# Patient Record
Sex: MALE | Race: WHITE | NOT HISPANIC OR LATINO | Employment: OTHER | URBAN - METROPOLITAN AREA
[De-identification: names, ages, dates, MRNs, and addresses within clinical notes are randomized per-mention and may not be internally consistent; named-entity substitution may affect disease eponyms.]

---

## 2017-12-12 ENCOUNTER — HOSPITAL ENCOUNTER (OUTPATIENT)
Dept: PREADMISSION TESTING | Facility: HOSPITAL | Age: 62
Discharge: HOME | End: 2017-12-12
Attending: PODIATRIST
Payer: COMMERCIAL

## 2017-12-12 ENCOUNTER — ANESTHESIA EVENT (OUTPATIENT)
Dept: SURGERY | Facility: HOSPITAL | Age: 62
End: 2017-12-12
Payer: COMMERCIAL

## 2017-12-12 VITALS
SYSTOLIC BLOOD PRESSURE: 138 MMHG | HEIGHT: 67 IN | HEART RATE: 84 BPM | BODY MASS INDEX: 27.86 KG/M2 | WEIGHT: 177.5 LBS | RESPIRATION RATE: 18 BRPM | TEMPERATURE: 98 F | OXYGEN SATURATION: 95 % | DIASTOLIC BLOOD PRESSURE: 96 MMHG

## 2017-12-12 DIAGNOSIS — Q85.00: ICD-10-CM

## 2017-12-12 DIAGNOSIS — B20 HIV (HUMAN IMMUNODEFICIENCY VIRUS INFECTION): ICD-10-CM

## 2017-12-12 DIAGNOSIS — Z01.818 PRE-OP EXAM: Primary | ICD-10-CM

## 2017-12-12 DIAGNOSIS — N40.0 BENIGN PROSTATIC HYPERPLASIA WITHOUT LOWER URINARY TRACT SYMPTOMS: ICD-10-CM

## 2017-12-12 DIAGNOSIS — I47.10 PAROXYSMAL SVT (SUPRAVENTRICULAR TACHYCARDIA): ICD-10-CM

## 2017-12-12 DIAGNOSIS — G47.33 OSA (OBSTRUCTIVE SLEEP APNEA): ICD-10-CM

## 2017-12-12 DIAGNOSIS — I10 ESSENTIAL HYPERTENSION: ICD-10-CM

## 2017-12-12 DIAGNOSIS — E78.00 HYPERCHOLESTEROLEMIA: ICD-10-CM

## 2017-12-12 RX ORDER — LIDOCAINE HYDROCHLORIDE 10 MG/ML
1 INJECTION, SOLUTION EPIDURAL; INFILTRATION; INTRACAUDAL; PERINEURAL ONCE
Status: CANCELLED | OUTPATIENT
Start: 2017-12-12 | End: 2017-12-12

## 2017-12-12 RX ORDER — EFAVIRENZ, EMTRICITABINE AND TENOFOVIR DISOPROXIL FUMARATE 600; 200; 300 MG/1; MG/1; MG/1
1 TABLET, FILM COATED ORAL NIGHTLY
COMMUNITY
End: 2019-01-29 | Stop reason: SDUPTHER

## 2017-12-12 RX ORDER — PRAVASTATIN SODIUM 40 MG/1
40 TABLET ORAL DAILY
COMMUNITY
End: 2021-04-27 | Stop reason: SDUPTHER

## 2017-12-12 RX ORDER — SODIUM CHLORIDE, SODIUM LACTATE, POTASSIUM CHLORIDE, CALCIUM CHLORIDE 600; 310; 30; 20 MG/100ML; MG/100ML; MG/100ML; MG/100ML
INJECTION, SOLUTION INTRAVENOUS CONTINUOUS
Status: CANCELLED | OUTPATIENT
Start: 2017-12-12

## 2017-12-12 RX ORDER — MIRTAZAPINE 30 MG/1
15 TABLET, ORALLY DISINTEGRATING ORAL NIGHTLY
COMMUNITY

## 2017-12-12 RX ORDER — HYDROGEN PEROXIDE 3 %
20 SOLUTION, NON-ORAL MISCELLANEOUS
COMMUNITY
End: 2020-05-26 | Stop reason: SDUPTHER

## 2017-12-12 RX ORDER — OMEPRAZOLE 10 MG/1
10 CAPSULE, DELAYED RELEASE ORAL DAILY
COMMUNITY
End: 2018-11-30

## 2017-12-12 NOTE — ANESTHESIA PREPROCEDURE EVALUATION
12/12/2017  Dmitry Epps is a 62 y.o., male with CINDY not on CPAP is scheduled for excision of neuroma w/biopsy and implant insertion under MAC/gen on 12/14/2017.    Requested most recent H&P/notes from outside cardiology and PCP:  PCP annual exam from 9/08/2017 and is in pt chart.  ID labs from 10/2017 reviewed and in pt chart.  Outside Cardiology note from 11/27/2017 with EKG (reviewed and NSR) in pt chart.      Past Surgical History:   Procedure Laterality Date    UVULECTOMY  2011            Anesthesia Evaluation    I have reviewed the Patient Summary Reports.    I have reviewed the Nursing Notes.   I have reviewed the Medications.     Review of Systems  Anesthesia Hx:  No problems with previous Anesthesia  History of prior surgery of interest to airway management or planning: Previous anesthesia: General, MAC  Denies Personal Hx of Anesthesia complications.   Social:  Non-Smoker, Alcohol Use    Hematology/Oncology:  Hematology Normal       -- Immunodeficiency Disorder (on atripla antiretroviral treatment; states CD4 counts and undectable viral load from outside MD):   EENT/Dental:EENT/Dental Normal   Cardiovascular:   Exercise tolerance: good Denies Hypertension. Dysrhythmias (hx of SVT; no recent episodes)   Denies Angina. hyperlipidemia        Pulmonary:   Denies Shortness of breath. 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   Neurological:  Neurology Normal  Neurofibromatosis Movement Disorder Dx, Restless Leg Syndrome   Endocrine:  Endocrine Normal    Psych:   depression          Physical Exam  General:  Well nourished    Airway/Jaw/Neck:  Airway Findings: Mouth Opening: Normal Tongue: Normal  General Airway Assessment: Adult  Oropharynx Findings: (s/p uvulectomy)  Hoarseness  Mallampati: II  TM Distance: Normal, at least 6 cm        Eyes/Ears/Nose:  EYES/EARS/NOSE FINDINGS: Normal   Dental:  Dental Findings: Periodontal disease, Mild   Chest/Lungs:  Chest/Lungs Clear    Heart/Vascular:  Heart Findings: Normal Heart murmur: negative    Abdomen:  Abdomen Findings: Normal    Musculoskeletal:  Musculoskeletal Findings: Tender Joint Neuroma left foot between 2nd and 3rd toes     Mental Status:  Mental Status Findings: Normal        Anesthesia Plan  Type of Anesthesia, risks & benefits discussed:  Anesthesia Type:  general, MAC  Patient's Preference:   Intra-op Monitoring Plan:   Intra-op Monitoring Plan Comments:   Post Op Pain Control Plan:   Post Op Pain Control Plan Comments:   Induction:   IV  Beta Blocker:  Patient is not currently on a Beta-Blocker (No further documentation required).       Informed Consent: Patient understands risks and agrees with Anesthesia plan.  Questions answered.   ASA Score: 2     Day of Surgery Review of History & Physical:     H&P completed by Anesthesiologist.   Anesthesia Plan Notes: Anesthesia consent will be obtained prior to procedure on 12/14/2017.    Requested most recent H&P/notes from outside cardiology and PCP:  PCP annual exam from 9/08/2017 and is in pt chart.  ID labs from 10/2017 reviewed and in pt chart.  Outside Cardiology note from 11/27/2017 with EKG (reviewed and NSR) in pt chart.             Ready For Surgery From Anesthesia Perspective.

## 2017-12-12 NOTE — DISCHARGE INSTRUCTIONS
Your surgery is scheduled for 12/14.    Please report to Outpatient Surgery Intake Office on the 2nd FLOOR at 0530a.m.          INSTRUCTIONS IMPORTANT!!!  ¨ Do not eat or drink after 12 midnight-including water. OK to brush teeth, no   gum, candy or mints!    ¨ Take only these medicines with a small swallow of water-morning of surgery.        ____  Proceed to [facility] on *** for additional blood test.        ____  Do not wear makeup, including mascara.  ____  No powder, lotions or creams to surgical area.  ____  Please remove all jewelry, including piercings and leave at home.  ____  No money or valuables needed. Please leave at home.  ____  Please bring any documents given by your doctor.  ____  If going home the same day, arrange for a ride home. You will not be able to             drive if Anesthesia was used.  ____  Wear loose fitting clothing. Allow for dressings, bandages.  ____  Stop Aspirin, Ibuprofen, Motrin and Aleve at least 3-5 days before surgery, unless otherwise instructed by your doctor, or the nurse.   You MAY use Tylenol/acetaminophen until day of surgery.  ____  Wash the surgical area with Hibiclens the night before surgery, and again the             morning of surgery.  Be sure to rinse hibiclens off completely (if instructed by   nurse).  ____  If you take diabetic medication, do not take am of surgery unless instructed by Doctor.  ____  Call MD for temperature above 101 degrees.  ____ Stop taking any Fish Oil supplement or any Vitamins that contain Vitamin E at least 5 days prior to surgery.  ____ Do Not wear your contact lenses the day of your procedure.  You may wear your glasses.        I have read or had read and explained to me, and understand the above information.  Additional comments or instructions:  For additional questions call [phone]          Pre-Op Bathing Instructions    Before surgery, you can play an important role in your own health.    Because skin is not  sterile, we need to be sure that your skin is as free of germs as possible. By following the instructions below, you can reduce the number of germs on your skin before surgery.    IMPORTANT: You will need to shower with a special soap called Hibiclens*, available at any pharmacy.  If you are allergic to Chlorhexidine (the antiseptic in Hibiclens), use an antibacterial soap such as Dial Soap for your preoperative shower.  You will shower with Hibiclens both the night before your surgery and the morning of your surgery.  Do not use Hibiclens on the head, face or genitals to avoid injury to those areas.    STEP #1: THE NIGHT BEFORE YOUR SURGERY     1. Do not shave the area of your body where your surgery will be performed.  2. Shower and wash your hair and body as usual with your normal soap and shampoo.  3. Rinse your hair and body thoroughly after you shower to remove all soap residue.  4. With your hand, apply one packet of Hibiclens soap to the surgical site.   5. Wash the site gently for five (5) minutes. Do not scrub your skin too hard.   6. Do not wash with your regular soap after Hibiclens is used.  7. Rinse your body thoroughly.  8. Pat yourself dry with a clean, soft towel.  9. Do not use lotion, cream, or powder.  10. Wear clean clothes.    STEP #2: THE MORNING OF YOUR SURGERY     1. Repeat Step #1.    * Not to be used by people allergic to Chlorhexidine.        Foot Surgery: Neuroma or Plantar Callus  Tight shoes and high heels can place extra pressure on the ball of your foot, causing neuromas and calluses. A neuroma is an inflamed nerve. It can cause pain, numbness, or burning. A plantar callus is a buildup of hard skin on the ball of the foot. The callus may feel like a stone in your shoe.  There are many nonsurgical treatments for neuromas and calluses, but if these are not helpful, surgery may be considered.    Neuroma  When two metatarsal bones are squeezed together, they may pinch the nerve that runs  between them. The pinched nerve can become swollen and painful. This often happens at the base of the third and the fourth toes. Standing or walking for a while can increase the pain.  Neuroma removal  The enlarged portion of the inflamed nerve is removed. Most often, you can bear weight on your foot right away. You may have to wear a surgical shoe for a few weeks. When healed, a small area may feel numb, where part of the nerve was taken out.    Plantar callus  When one metatarsal bone is longer or lower than the others, it presses on the skin beneath, forming a callus. Wearing shoes with thin soles and high heels can also place extra pressure on the ball of your foot. As a result, the callus may cause foot pain and irritation.  Bone removal  The affected metatarsal bone is cut and aligned with the other metatarsals (oblique osteotomy). Screws or pins may be used to hold the bone in position. Only part of the metatarsal bone is removed. The plantar callus should go away on its own over time.  Date Last Reviewed: 10/15/2015  © 4467-1084 [employer]. [address] [place] [address]. All rights reserved. This information is not intended as a substitute for professional medical care. Always follow your healthcare professional's instructions.      Anesthesia: Monitored Anesthesia Care (MAC)    Youre due to have surgery. During surgery, youll be given medicine called anesthesia. This will keep you comfortable and pain-free. Your surgeon will use monitored anesthesia care (MAC). This sheet tells you more about this type of anesthesia.  What is monitored anesthesia care?  MAC keeps you very drowsy during surgery. You may be awake, but you will likely not remember much. And you wont feel pain. With MAC, medicines are given through an IV line into a vein in your arm or hand. A local anesthetic will usually be injected into the skin and muscle around the surgical site to numb it. The anesthesia  provider monitors you during the procedure. He or she checks your heart rate and rhythm, blood pressure, and blood oxygen level.  Anesthesia tools and medicines that may be near you during your procedure  You will likely have:  · A pulse oximeter on the end of your finger. This measures your blood oxygen level.  · Electrocardiography leads (electrodes) on your chest. These record your heart rate and rhythm.  · Medicines given through an IV. These relax you and prevent pain. You may be awake or sleep lightly. If you have local anesthetic, it is injected directly into your skin.  · A facemask to give you oxygen, if needed.  Risks and possible complications  MAC has some risks. These include:  · Breathing problems  · Nausea and vomiting  · Allergic reaction to the anesthetic    Anesthesia safety  Tips for anesthesia safety include the following:   · Follow all instructions you are given for how long not to eat or drink before your procedure.  · Be sure your healthcare provider knows what medicines you take, especially any anti-inflammatory medicine or blood thinners. This includes aspirin and any other over-the-counter medicines, herbs, and supplements.  · Have an adult family member or friend drive you home after the procedure.  · For the first 24 hours after your surgery:  ¨ Do not drive or use heavy equipment.  ¨ Do not make important decisions or sign documents.  ¨ Avoid alcohol.  ¨ Have someone stay with you, if possible. They can watch for problems and help keep you safe.  Date Last Reviewed: 12/1/2016  © 0909-3337 [employer]. [address] [place] [address], [place] [address]. All rights reserved. This information is not intended as a substitute for professional medical care. Always follow your healthcare professional's instructions.

## 2017-12-12 NOTE — PRE-PROCEDURE INSTRUCTIONS
Pt arranging for ride home.      Allergies, medical, surgical, family and psychosocial histories reviewed with patient. Periop plan of care reviewed. Preop instructions given, including medications to take and to hold. Time allotted for questions to be addressed.  Patient verbalized understanding.

## 2017-12-13 PROBLEM — Q85.00: Status: ACTIVE | Noted: 2017-12-13

## 2017-12-13 PROBLEM — E78.00 HYPERCHOLESTEROLEMIA: Status: ACTIVE | Noted: 2017-12-13

## 2017-12-13 PROBLEM — I47.10 PAROXYSMAL SVT (SUPRAVENTRICULAR TACHYCARDIA): Status: ACTIVE | Noted: 2017-12-13

## 2017-12-13 PROBLEM — I10 ESSENTIAL HYPERTENSION: Status: ACTIVE | Noted: 2017-12-13

## 2017-12-13 PROBLEM — N40.0 BENIGN PROSTATIC HYPERPLASIA WITHOUT LOWER URINARY TRACT SYMPTOMS: Status: ACTIVE | Noted: 2017-12-13

## 2017-12-14 ENCOUNTER — ANESTHESIA (OUTPATIENT)
Dept: SURGERY | Facility: HOSPITAL | Age: 62
End: 2017-12-14
Payer: COMMERCIAL

## 2017-12-14 ENCOUNTER — HOSPITAL ENCOUNTER (OUTPATIENT)
Facility: HOSPITAL | Age: 62
Discharge: HOME | End: 2017-12-14
Attending: PODIATRIST | Admitting: PODIATRIST
Payer: COMMERCIAL

## 2017-12-14 DIAGNOSIS — D36.10 NEUROMA: ICD-10-CM

## 2017-12-14 PROCEDURE — 63600175 PHARM REV CODE 636 W HCPCS: Performed by: NURSE ANESTHETIST, CERTIFIED REGISTERED

## 2017-12-14 PROCEDURE — 27800903 OPTIME MED/SURG SUP & DEVICES OTHER IMPLANTS: Performed by: PODIATRIST

## 2017-12-14 PROCEDURE — V2790 AMNIOTIC MEMBRANE: HCPCS | Performed by: PODIATRIST

## 2017-12-14 PROCEDURE — 88305 TISSUE EXAM BY PATHOLOGIST: CPT | Mod: 26,,, | Performed by: PATHOLOGY

## 2017-12-14 PROCEDURE — 71000015 HC POSTOP RECOV 1ST HR: Performed by: PODIATRIST

## 2017-12-14 PROCEDURE — 25000003 PHARM REV CODE 250: Performed by: PODIATRIST

## 2017-12-14 PROCEDURE — 63600175 PHARM REV CODE 636 W HCPCS: Performed by: PODIATRIST

## 2017-12-14 PROCEDURE — 37000008 HC ANESTHESIA 1ST 15 MINUTES: Performed by: PODIATRIST

## 2017-12-14 PROCEDURE — 36000706: Performed by: PODIATRIST

## 2017-12-14 PROCEDURE — 37000009 HC ANESTHESIA EA ADD 15 MINS: Performed by: PODIATRIST

## 2017-12-14 PROCEDURE — 88305 TISSUE EXAM BY PATHOLOGIST: CPT | Performed by: PATHOLOGY

## 2017-12-14 PROCEDURE — 36000707: Performed by: PODIATRIST

## 2017-12-14 PROCEDURE — 25000003 PHARM REV CODE 250: Performed by: NURSE PRACTITIONER

## 2017-12-14 DEVICE — IMPLANTABLE DEVICE: Type: IMPLANTABLE DEVICE | Site: FOOT | Status: FUNCTIONAL

## 2017-12-14 DEVICE — TISSUE MATRIX BIOD RESTORE XLG: Type: IMPLANTABLE DEVICE | Site: FOOT | Status: FUNCTIONAL

## 2017-12-14 RX ORDER — ROPIVACAINE HYDROCHLORIDE 5 MG/ML
INJECTION, SOLUTION EPIDURAL; INFILTRATION; PERINEURAL
Status: DISCONTINUED | OUTPATIENT
Start: 2017-12-14 | End: 2017-12-14 | Stop reason: HOSPADM

## 2017-12-14 RX ORDER — MIDAZOLAM HYDROCHLORIDE 1 MG/ML
INJECTION, SOLUTION INTRAMUSCULAR; INTRAVENOUS
Status: DISCONTINUED | OUTPATIENT
Start: 2017-12-14 | End: 2017-12-14

## 2017-12-14 RX ORDER — SODIUM CHLORIDE, SODIUM LACTATE, POTASSIUM CHLORIDE, CALCIUM CHLORIDE 600; 310; 30; 20 MG/100ML; MG/100ML; MG/100ML; MG/100ML
INJECTION, SOLUTION INTRAVENOUS CONTINUOUS
Status: DISCONTINUED | OUTPATIENT
Start: 2017-12-14 | End: 2017-12-14 | Stop reason: HOSPADM

## 2017-12-14 RX ORDER — LIDOCAINE HYDROCHLORIDE 10 MG/ML
INJECTION, SOLUTION EPIDURAL; INFILTRATION; INTRACAUDAL; PERINEURAL
Status: DISCONTINUED | OUTPATIENT
Start: 2017-12-14 | End: 2017-12-14 | Stop reason: HOSPADM

## 2017-12-14 RX ORDER — PROPOFOL 10 MG/ML
VIAL (ML) INTRAVENOUS CONTINUOUS PRN
Status: DISCONTINUED | OUTPATIENT
Start: 2017-12-14 | End: 2017-12-14

## 2017-12-14 RX ORDER — CEFAZOLIN SODIUM 2 G/50ML
2 SOLUTION INTRAVENOUS ONCE
Status: COMPLETED | OUTPATIENT
Start: 2017-12-14 | End: 2017-12-14

## 2017-12-14 RX ORDER — HYDROCODONE BITARTRATE AND ACETAMINOPHEN 5; 325 MG/1; MG/1
1 TABLET ORAL EVERY 4 HOURS PRN
Status: DISCONTINUED | OUTPATIENT
Start: 2017-12-14 | End: 2017-12-14 | Stop reason: HOSPADM

## 2017-12-14 RX ORDER — LIDOCAINE HYDROCHLORIDE 10 MG/ML
1 INJECTION, SOLUTION EPIDURAL; INFILTRATION; INTRACAUDAL; PERINEURAL ONCE
Status: DISCONTINUED | OUTPATIENT
Start: 2017-12-14 | End: 2017-12-14 | Stop reason: HOSPADM

## 2017-12-14 RX ORDER — FENTANYL CITRATE 50 UG/ML
INJECTION, SOLUTION INTRAMUSCULAR; INTRAVENOUS
Status: DISCONTINUED | OUTPATIENT
Start: 2017-12-14 | End: 2017-12-14

## 2017-12-14 RX ORDER — LIDOCAINE HCL/PF 100 MG/5ML
SYRINGE (ML) INTRAVENOUS
Status: DISCONTINUED | OUTPATIENT
Start: 2017-12-14 | End: 2017-12-14

## 2017-12-14 RX ORDER — HYDROCODONE BITARTRATE AND ACETAMINOPHEN 5; 325 MG/1; MG/1
1 TABLET ORAL EVERY 6 HOURS PRN
Qty: 30 TABLET | Refills: 0 | Status: SHIPPED | OUTPATIENT
Start: 2017-12-14 | End: 2019-07-12

## 2017-12-14 RX ORDER — PROPOFOL 10 MG/ML
VIAL (ML) INTRAVENOUS
Status: DISCONTINUED | OUTPATIENT
Start: 2017-12-14 | End: 2017-12-14

## 2017-12-14 RX ADMIN — SODIUM CHLORIDE, SODIUM LACTATE, POTASSIUM CHLORIDE, AND CALCIUM CHLORIDE: .6; .31; .03; .02 INJECTION, SOLUTION INTRAVENOUS at 06:12

## 2017-12-14 RX ADMIN — LIDOCAINE HYDROCHLORIDE 80 MG: 20 INJECTION, SOLUTION INTRAVENOUS at 07:12

## 2017-12-14 RX ADMIN — PROPOFOL 30 MG: 10 INJECTION, EMULSION INTRAVENOUS at 07:12

## 2017-12-14 RX ADMIN — CEFAZOLIN SODIUM 2 G: 2 SOLUTION INTRAVENOUS at 06:12

## 2017-12-14 RX ADMIN — MIDAZOLAM 2 MG: 1 INJECTION INTRAMUSCULAR; INTRAVENOUS at 06:12

## 2017-12-14 RX ADMIN — FENTANYL CITRATE 50 MCG: 50 INJECTION, SOLUTION INTRAMUSCULAR; INTRAVENOUS at 06:12

## 2017-12-14 RX ADMIN — FENTANYL CITRATE 50 MCG: 50 INJECTION, SOLUTION INTRAMUSCULAR; INTRAVENOUS at 07:12

## 2017-12-14 RX ADMIN — PROPOFOL 150 MCG/KG/MIN: 10 INJECTION, EMULSION INTRAVENOUS at 07:12

## 2017-12-14 NOTE — ANESTHESIA POSTPROCEDURE EVALUATION
"Anesthesia Post Evaluation    Patient: Dmitry Epps    Procedure(s) Performed: Procedure(s) (LRB):  EXCISION-NEUROMA (Left)  INSERTION-IMPLANT (Left)  BIOPSY (Left)    Final Anesthesia Type: MAC  Patient location during evaluation: OPS  Patient participation: Yes- Able to Participate  Level of consciousness: awake and alert and oriented  Post-procedure vital signs: reviewed and stable  Pain management: adequate  Airway patency: patent  PONV status at discharge: No PONV  Anesthetic complications: no      Cardiovascular status: blood pressure returned to baseline and hemodynamically stable  Respiratory status: unassisted  Hydration status: euvolemic  Follow-up not needed.        Visit Vitals  /76 (BP Location: Left arm, Patient Position: Lying)   Pulse 69   Temp 37 °C (98.6 °F) (Oral)   Resp 18   Ht 5' 7" (1.702 m)   Wt 80.3 kg (177 lb)   SpO2 98%   BMI 27.72 kg/m²       Pain/Cuate Score: Pain Assessment Performed: Yes (12/14/2017  6:24 AM)  Presence of Pain: denies (12/14/2017  6:24 AM)      "

## 2017-12-14 NOTE — OP NOTE
Operative Note       Surgery Date: 12/14/2017     Surgeon(s) and Role:     * Josef Newton DPM - Primary     * Gilda Schneider DPM - 1st Assist     Pre-op Diagnosis:  Pain in left toe(s) [M79.675]  Gonzalez's neuroma, left [G57.62]    Post-op Diagnosis: Post-Op Diagnosis Codes:     * Pain in left toe(s) [M79.675]     * Gonzalez's neuroma, left [G57.62]    Procedure(s) (LRB):  EXCISION-NEUROMA (Left)  INSERTION-IMPLANT (Left)  BIOPSY (Left)    Anesthesia: Monitor Anesthesia Care    Procedure in Detail/Findings:  The patient was brought to the operating room on a stretcher and placed on the operating table in a supine position. Following the successful induction of MAC anesthesia, a tourniquet was applied to the patients left ankle. Following this, a local anesthetic block consisting of aproximately 15 cc ml of 1:1 mixture of 2% lidocaine plain + 0.5% bupivacaine plain was injected as described above. Then, the left foot was scrubbed, prepped and draped in the usual aseptic manner. A marking pen was utilized to create a incision guide in a curve linear fashion within the 2nd intermetatarsal space which extended approximately 3-4cm. A time out was performed and an esmarch was used to exsanguinate the foot. The tourniquet was inflated to 250mmHg.      Attention was directed to the marked incision where a #15 blade was used to make the skin incision which was deepened down to the subcutaneous tissue. Care was taken to avoid all neurovascular structures. The incision was bluntly deepened down to the level of the deep transverse intermetatarsal ligament, which was isolated using a curved hemostat and resected with a #15 blade. Once this was done, toes 2nd and 3rd were plantarflexed and an enlarged bulbous nerve structure with multiple branches was obviously visible. This was grasped with a hemostat and a was bluntly  from its soft tissue structures. The nerve was noted to be excessively enlarged and bulbous in  nature. The nerve was followed distally and proximally as far as possible and was resected using a #15 blade. The area was inspected for any remaining nerve structures, which were resected as necessary. All resected soft tissue nerve structures were placed in a sterile specimen cup and sent for pathologic testing. The area was copiously irrigated with saline and deep 3-0 Vicryl suture was used to reapproximate the deeper tissues and eliminate any dead space. This same exact procedure was repeated to the 3rd interspace of the same foot. Subcutaneous tissue was reapproximated using 3-0 Vicryl. The skin was reapproximated using 4-0 nylon using interrupted horizontal mattress sutures. The skin was cleansed using saline soaked gauze and dried. Xeroform was applied to the incision and covered with sterile 4x4 gauze, Kerlix and ACE wrap. The foot was secured in a post op shoe.     The patient tolerated the procedure and anesthesia well. He was transferred to the recovery room with vital signs stable, vascular status intact, and capillary refill time < 3 seconds to the distal left foot.     Estimated Blood Loss: < 5 cc            Specimens     Start     Ordered    12/14/17 0745  Specimen to Pathology - Surgery  Once      12/14/17 0744        Implants:   Implant Name Type Inv. Item Serial No.  Lot No. LRB No. Used   [id number]  TISSUE [employer] BIOD RESTORE XLG [id number] Cumed  Left 1   [id number]     [id number] [employer]   Left 1              Disposition: PACU - hemodynamically stable.           Condition: Good    Attestation:  I was present and scrubbed for the entire procedure.           Discharge Note    Admit Date: 12/14/2017    Attending Physician: Josef Newton DPM     Discharge Physician: Josef Newton DPM    Final Diagnosis: Post-Op Diagnosis Codes:     * Pain in left toe(s) [M79.675]     * Gonzalez's neuroma, left [G57.62]    Disposition: Home or Self Care    Patient Instructions:    Current Discharge Medication List      START taking these medications    Details   hydrocodone-acetaminophen 5-325mg (NORCO) 5-325 mg per tablet Take 1 tablet by mouth every 6 (six) hours as needed for Pain.  Qty: 30 tablet, Refills: 0         CONTINUE these medications which have NOT CHANGED    Details   efavirenz-emtrictabine-tenofovir 600-200-300 mg (ATRIPLA) 600-200-300 mg Tab Take 1 tablet by mouth every evening.      esomeprazole (NEXIUM) 20 MG capsule Take 20 mg by mouth before breakfast.      mirtazapine (REMERON) 45 MG tablet Take 45 mg by mouth nightly.      omeprazole (PRILOSEC) 10 MG capsule Take 10 mg by mouth once daily.      pravastatin (PRAVACHOL) 40 MG tablet Take 40 mg by mouth once daily.             Discharge Procedure Orders (must include Diet, Follow-up, Activity)    Discharge Procedure Orders (must include Diet, Follow-up, Activity)  Diet general     Weight bearing restrictions (specify)   Order Comments: NWB to sx forefoot     Keep surgical extremity elevated     Leave dressing on - Keep it clean, dry, and intact until clinic visit          Discharge Date: No discharge date for patient encounter.

## 2017-12-14 NOTE — DISCHARGE INSTRUCTIONS
Discharge Instructions for Foot Surgery  Arrange to have an adult drive you home after surgery. If you had general anesthesia, it may take a day or more to fully recover. So, for at least the next 24 hours: Do not drive or use machinery or power tools; do not drink alcohol; and do not make any major decisions.    Diet  Here are some dietary suggestions following surgery:   · Start with liquids and light foods (like dry toast, bananas, and applesauce). As you feel up to it, slowly return to your normal diet.  · Drink at least 6 to 8 glasses of water or other nonalcoholic fluids a day.  · To avoid nausea, eat before taking narcotic pain medicines.  ·   Medicines  It is important to follow these directions:   · Take all medicines as instructed.  · Take pain medicines on time. Do not wait until the pain is bad before taking your medicines.  · Avoid alcohol while on pain medicines.  ·   Activity  These instructions are to help with your recovery:   · Sit or lie down when possible. Put a pillow under your heel to raise your foot above the level of your heart.  · Wrap an ice pack or bag of frozen peas in a thin cloth. Place it over your bandaged foot for no longer than 20 minutes. Do this three times a day.  · You can drive again in seven days or as instructed by your healthcare provider.  · Wear your surgical shoe at all times unless told otherwise by your healthcare provider.  · Follow your healthcare providers instructions about putting weight on your foot. OK to apply weight to heel for transfers and short distances.  ·   Bandage and cast care  Here are tips to follow:   · Do not shower for 48 hours.  · When you can shower again, cover the bandage or cast with a plastic bag to keep it dry.  · Dont remove your bandage until your healthcare provider tells you to. If your bandage gets wet or dirty, check with your healthcare provider. You can likely replace it with a clean, dry one.  ·   What to expect  It is normal  to have the following:  · Bruising and slight swelling of the foot and toes  · A small amount of blood on the dressing  Call your healthcare provider   Contact your healthcare provider right away if you have any of the following:   · Continuous bleeding through the bandage  · Excessive swelling, increased bleeding, or redness  · Fever over 100.4°F (38°C) or chills  · Pain unrelieved by pain medicines  · Foot feels cold to the touch or numb  · Increased ache in your leg or foot  Chest pain or shortness of breath      Discharge Instructions: After Your Surgery  Youve just had surgery. During surgery, you were given medicine called anesthesia to keep you relaxed and free of pain. After surgery, you may have some pain or nausea. This is common. Here are some tips for feeling better and getting well after surgery.     Stay on schedule with your medicine.       Going home  Your healthcare provider will show you how to take care of yourself when you go home. He or she will also answer your questions. Have an adult family member or friend drive you home. For the first 24 hours after your surgery:  · Do not drive or use heavy equipment.  · Do not make important decisions or sign legal papers.  · Do not drink alcohol.  · Have someone stay with you, if needed. He or she can watch for problems and help keep you safe.  Be sure to go to all follow-up visits with your healthcare provider. And rest after your surgery for as long as your healthcare provider tells you to.      Coping with pain  If you have pain after surgery, pain medicine will help you feel better. Take it as told, before pain becomes severe. Also, ask your healthcare provider or pharmacist about other ways to control pain. This might be with heat, ice, or relaxation. And follow any other instructions your surgeon or nurse gives you.      Tips for taking pain medicine  To get the best relief possible, remember these points:  · Pain medicines can upset your stomach.  Taking them with a little food may help.  · Most pain relievers taken by mouth need at least 20 to 30 minutes to start to work.  · Taking medicine on a schedule can help you remember to take it. Try to time your medicine so that you can take it before starting an activity. This might be before you get dressed, go for a walk, or sit down for dinner.  · Constipation is a common side effect of pain medicines. Call your healthcare provider before taking any medicines such as laxatives or stool softeners to help ease constipation. Also ask if you should skip any foods. Drinking lots of fluids and eating foods such as fruits and vegetables that are high in fiber can also help. Remember, do not take laxatives unless your surgeon has prescribed them.  · Drinking alcohol and taking pain medicine can cause dizziness and slow your breathing. It can even be deadly. Do not drink alcohol while taking pain medicine.  · Pain medicine can make you react more slowly to things. Do not drive or run machinery while taking pain medicine.  Your healthcare provider may tell you to take acetaminophen to help ease your pain. Ask him or her how much you are supposed to take each day. Acetaminophen or other pain relievers may interact with your prescription medicines or other over-the-counter (OTC) medicines. Some prescription medicines have acetaminophen and other ingredients. Using both prescription and OTC acetaminophen for pain can cause you to overdose. Read the labels on your OTC medicines with care. This will help you to clearly know the list of ingredients, how much to take, and any warnings. It may also help you not take too much acetaminophen. If you have questions or do not understand the information, ask your pharmacist or healthcare provider to explain it to you before you take the OTC medicine.      Managing nausea  Some people have an upset stomach after surgery. This is often because of anesthesia, pain, or pain medicine, or  the stress of surgery. These tips will help you handle nausea and eat healthy foods as you get better. If you were on a special food plan before surgery, ask your healthcare provider if you should follow it while you get better. These tips may help:  · Do not push yourself to eat. Your body will tell you when to eat and how much.  · Start off with clear liquids and soup. They are easier to digest.  · Next try semi-solid foods, such as mashed potatoes, applesauce, and gelatin, as you feel ready.  · Slowly move to solid foods. Dont eat fatty, rich, or spicy foods at first.  · Do not force yourself to have 3 large meals a day. Instead eat smaller amounts more often.  · Take pain medicines with a small amount of solid food, such as crackers or toast, to avoid nausea.     Call your surgeon if  · You still have pain an hour after taking medicine. The medicine may not be strong enough.  · You feel too sleepy, dizzy, or groggy. The medicine may be too strong.  · You have side effects like nausea, vomiting, or skin changes, such as rash, itching, or hives.       If you have obstructive sleep apnea  You were given anesthesia medicine during surgery to keep you comfortable and free of pain. After surgery, you may have more apnea spells because of this medicine and other medicines you were given. The spells may last longer than usual.   At home:  · Keep using the continuous positive airway pressure (CPAP) device when you sleep. Unless your healthcare provider tells you not to, use it when you sleep, day or night. CPAP is a common device used to treat obstructive sleep apnea.  · Talk with your provider before taking any pain medicine, muscle relaxants, or sedatives. Your provider will tell you about the possible dangers of taking these medicines.        ·   · Anything unusual that concerns you     Rest, ice and elevation  Keep the dressing clean ,dry and intact  Notify Dr. Newton for any questions problems or  concerns.

## 2017-12-14 NOTE — PLAN OF CARE
Criteria met for discharge.IV removed,discharge instructions explained,copy given with RX. Pt denies any c/o pain,tolerating po well, have no further questions. Discharged home with family in good condition.

## 2017-12-14 NOTE — BRIEF OP NOTE
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 Stump implanted. Neuroma sent to pathology     Estimated Blood Loss:< 5 cc          Specimens:   Specimen (12h ago through future)    Start     Ordered    12/14/17 0745  Specimen to Pathology - Surgery  Once     Comments:  1. Neuroma left second innerspace      12/14/17 0744          Discharge Note    SUMMARY     Admit Date: 12/14/2017    Discharge Date and Time:  12/14/2017 7:57 AM    Hospital Course (synopsis of major diagnoses, care, treatment, and services provided during the course of the hospital stay):      Final Diagnosis: Post-Op Diagnosis Codes:     * Pain in left toe(s) [M79.675]     * Gonzalez's neuroma, left [G57.62]    Disposition: Home or Self Care    Follow Up/Patient Instructions:     Medications:  Reconciled Home Medications:   Current Discharge Medication List      CONTINUE these medications which have NOT CHANGED    Details   efavirenz-emtrictabine-tenofovir 600-200-300 mg (ATRIPLA) 600-200-300 mg Tab Take 1 tablet by mouth every evening.      esomeprazole (NEXIUM) 20 MG capsule Take 20 mg by mouth before breakfast.      mirtazapine (REMERON) 45 MG tablet Take 45 mg by mouth nightly.      omeprazole (PRILOSEC) 10 MG capsule Take 10 mg by mouth once daily.      pravastatin (PRAVACHOL) 40 MG tablet Take 40 mg by mouth once daily.            No discharge procedures on file.

## 2017-12-14 NOTE — H&P
Anesthesia Plan  Type of Anesthesia, risks & benefits discussed:  Anesthesia Type:  general, MAC  Patient's Preference:   Intra-op Monitoring Plan:   Intra-op Monitoring Plan Comments:   Post Op Pain Control Plan:   Post Op Pain Control Plan Comments:   Induction:   IV  Beta Blocker:  Patient is not currently on a Beta-Blocker (No further documentation required).       Informed Consent: Patient understands risks and agrees with Anesthesia plan.  Questions answered.   ASA Score: 2     Day of Surgery Review of History & Physical:     H&P completed by Anesthesiologist.   Anesthesia Plan Notes: Anesthesia consent will be obtained prior to procedure on 12/14/2017.    Requested most recent H&P/notes from outside cardiology and PCP:  PCP annual exam from 9/08/2017 and is in pt chart.  ID labs from 10/2017 reviewed and in pt chart.  Outside Cardiology note from 11/27/2017 with EKG (reviewed and NSR) in pt chart.             Ready For Surgery From Anesthesia Perspective.

## 2017-12-14 NOTE — INTERVAL H&P NOTE
The patient has been examined and the H&P has been reviewed:    no changes    Anesthesia/Surgery risks, benefits and alternative options discussed and understood by patient/family.          There are no hospital problems to display for this patient.

## 2017-12-14 NOTE — TRANSFER OF CARE
"Anesthesia Transfer of Care Note    Patient: Dmitry Epps    Procedure(s) Performed: Procedure(s) (LRB):  EXCISION-NEUROMA (Left)  INSERTION-IMPLANT (Left)  BIOPSY (Left)    Patient location: OPS    Anesthesia Type: MAC    Transport from OR: Transported from OR on room air with adequate spontaneous ventilation    Post pain: adequate analgesia    Post assessment: no apparent anesthetic complications and tolerated procedure well    Post vital signs: stable    Level of consciousness: awake, alert and oriented    Nausea/Vomiting: no nausea/vomiting    Complications: none    Transfer of care protocol was followed      Last vitals:   Visit Vitals  /76 (BP Location: Left arm, Patient Position: Lying)   Pulse 69   Temp 37 °C (98.6 °F) (Oral)   Resp 18   Ht 5' 7" (1.702 m)   Wt 80.3 kg (177 lb)   SpO2 98%   BMI 27.72 kg/m²     "

## 2017-12-15 VITALS
DIASTOLIC BLOOD PRESSURE: 72 MMHG | TEMPERATURE: 98 F | HEIGHT: 67 IN | HEART RATE: 75 BPM | SYSTOLIC BLOOD PRESSURE: 128 MMHG | OXYGEN SATURATION: 96 % | BODY MASS INDEX: 27.78 KG/M2 | RESPIRATION RATE: 18 BRPM | WEIGHT: 177 LBS

## 2018-11-15 DIAGNOSIS — G47.00 INSOMNIA, UNSPECIFIED TYPE: Primary | ICD-10-CM

## 2018-11-15 RX ORDER — ALPRAZOLAM 0.5 MG/1
TABLET ORAL
Refills: 3 | COMMUNITY
Start: 2018-10-15 | End: 2018-11-15 | Stop reason: SDUPTHER

## 2018-11-16 RX ORDER — ALPRAZOLAM 0.5 MG/1
TABLET ORAL
Qty: 30 TABLET | Refills: 5 | Status: SHIPPED | OUTPATIENT
Start: 2018-11-16 | End: 2019-05-14 | Stop reason: SDUPTHER

## 2018-11-27 RX ORDER — ESOMEPRAZOLE MAGNESIUM 40 MG/1
40 CAPSULE, DELAYED RELEASE ORAL DAILY
Refills: 1 | COMMUNITY
Start: 2018-11-07 | End: 2018-11-30

## 2018-11-30 ENCOUNTER — OFFICE VISIT (OUTPATIENT)
Dept: INFECTIOUS DISEASES | Facility: CLINIC | Age: 63
End: 2018-11-30
Payer: MEDICARE

## 2018-11-30 VITALS
HEIGHT: 67 IN | BODY MASS INDEX: 28.88 KG/M2 | HEART RATE: 74 BPM | WEIGHT: 184 LBS | OXYGEN SATURATION: 95 % | SYSTOLIC BLOOD PRESSURE: 126 MMHG | DIASTOLIC BLOOD PRESSURE: 74 MMHG | TEMPERATURE: 98 F

## 2018-11-30 DIAGNOSIS — Z23 ENCOUNTER FOR IMMUNIZATION: ICD-10-CM

## 2018-11-30 DIAGNOSIS — G47.33 OSA (OBSTRUCTIVE SLEEP APNEA): ICD-10-CM

## 2018-11-30 DIAGNOSIS — M85.80 OSTEOPENIA, UNSPECIFIED LOCATION: ICD-10-CM

## 2018-11-30 DIAGNOSIS — B20 HIV (HUMAN IMMUNODEFICIENCY VIRUS INFECTION): Primary | ICD-10-CM

## 2018-11-30 DIAGNOSIS — Q85.00: ICD-10-CM

## 2018-11-30 DIAGNOSIS — Z79.899 ENCOUNTER FOR LONG-TERM (CURRENT) USE OF MEDICATIONS: ICD-10-CM

## 2018-11-30 PROBLEM — E78.5 HYPERLIPIDEMIA: Status: ACTIVE | Noted: 2017-12-13

## 2018-11-30 PROCEDURE — G0009 ADMIN PNEUMOCOCCAL VACCINE: HCPCS | Mod: ,,, | Performed by: INTERNAL MEDICINE

## 2018-11-30 PROCEDURE — 99214 OFFICE O/P EST MOD 30 MIN: CPT | Mod: 25,,, | Performed by: INTERNAL MEDICINE

## 2018-11-30 PROCEDURE — 90732 PPSV23 VACC 2 YRS+ SUBQ/IM: CPT | Mod: ,,, | Performed by: INTERNAL MEDICINE

## 2018-11-30 NOTE — PATIENT INSTRUCTIONS
[facility] at [facility]    Ask Dr. Trevino to order your MRI before his next visit with you    Go for lab next week    Reduce alcohol and alternate drinks with water    Return in 6 months    Pneumovax (your last ever pneumonia vaccine)

## 2018-11-30 NOTE — PROGRESS NOTES
Subjective:       Patient ID: Dmitry Epps is a 63 y.o. male.    Chief Complaint:: Follow-up ( 6 mos )    HPI  Since last visit. He did not see Dr. Trevino nor have his MRI , missed his appointment  Did not have lab as requested  Colonoscopy isn't until 2020  Has not reduced alcohol yet  Had yearly physical with Dr. De Paz in September and did not have his lab orders either. Prostate LOU was normal. Had a stomach bug that day.     Current Outpatient Medications:     ALPRAZolam (XANAX) 0.5 MG tablet, TK 1 T PO  QHS PRF NEEDED FOR SLEEP, Disp: 30 tablet, Rfl: 5    efavirenz-emtrictabine-tenofovir 600-200-300 mg (ATRIPLA) 600-200-300 mg Tab, Take 1 tablet by mouth every evening., Disp: , Rfl:     esomeprazole (NEXIUM) 20 MG capsule, Take 20 mg by mouth before breakfast., Disp: , Rfl:     FLUZONE HIGH-DOSE 2018-19, PF, 180 mcg/0.5 mL vaccine, ADM 0.5ML IM UTD, Disp: , Rfl: 0    hydrocodone-acetaminophen 5-325mg (NORCO) 5-325 mg per tablet, Take 1 tablet by mouth every 6 (six) hours as needed for Pain., Disp: 30 tablet, Rfl: 0    mirtazapine (REMERON) 45 MG tablet, Take 45 mg by mouth nightly., Disp: , Rfl:     pravastatin (PRAVACHOL) 40 MG tablet, Take 40 mg by mouth once daily., Disp: , Rfl:   Review of patient's allergies indicates:   Allergen Reactions    Sulfa (sulfonamide antibiotics) Rash     Past Medical History:   Diagnosis Date    Hyperlipidemia     CINDY (obstructive sleep apnea)    HIV  HPV  [place]  Neurofibromatosis  Restless leg syndrome  Abdul leobardo syndrome, cause unclear  Hyperlipidemia  GERD, Snell's esophagus  Detached vitreous bilateral  Olecranon bursitis, left elbow 10/2015,   Giardia 2014  Colon polyps    Past Surgical History:   Procedure Laterality Date    BIOPSY Left 12/14/2017    [facility] OR    EXCISION-NEUROMA Left 12/14/2017    [facility] OR    INSERTION-IMPLANT Left 12/14/2017    [facility]  OR    UVULECTOMY  2011        tonsillectomy, septum/uvuloplasty 2011, prostate biopsy, colonoscopy with adenomatous polyp    Social History     Socioeconomic History    Marital status: Single     Spouse name: None    Number of children: None    Years of education: None    Highest education level: None   Social Needs    Financial resource strain: None    Food insecurity - worry: None    Food insecurity - inability: None    Transportation needs - medical: None    Transportation needs - non-medical: None   Occupational History    None   Tobacco Use    Smoking status: Never Smoker    Smokeless tobacco: Never Used   Substance and Sexual Activity    Alcohol use: Yes     Comment: 3-5 times a week    Drug use: No    Sexual activity: None   Other Topics Concern    None   Social History Narrative    None     History reviewed. No pertinent family history. Multiple family members with neurofibromatosis    Travel History:   Vaccine History: Yearly flu vaccine, Menactra 2011, typhoid 2011, Pneumovax 2007, Prevnar 2014, DVT 2005, hepatitis A 1 into 2005, Zostavax 2014, hepatitis B series nonresponder, repeated 2016 nonresponder, shingrix #1 and 2 2018  Advanced Directive:   Safer Sex: Abstinent  Bone Density: Osteopenia 2011, 2015  Colonoscopy: 2004, 2007, 2015      :Review of Systems    Constitutional: No fever, chills, sweats, fatigue, weakness, weight loss    Eyes: No change in vision, loss of vision, diplopia, photophobia. UTD eye exam    ENT: No sinus drainage, sore throat, mouth pain, or lesions. UTD dental exam    Cardiovascular: No chest pain, KEENAN, palpitations or pedal edema    Respiratory: No shortness of breath, KEENAN, cough, wheeze, sputum, pleurisy, or hemoptysis    Gastrointestinal: No abdominal pain, nausea, vomiting, diarrhea, constipation, blood in stool, or focal abd pain    Genitourinary: No dysuria, hematuria, incontinence, frequency, flank pain,     Musculoskeletal: No new pain, joint swelling,  "or injuries, still having issues with is feet    Integumentary: No new rashes, lesions, or wounds    Vascular: 1+ pitting edema of lower legs    Neurological: No unusual headaches, neuropathy, or falls. Still has occasional vertigo and tinnitus. Was fully evaluated by ENT    Psychiatric: No anxiety, depression, may have a little memory loss, no sleep disturbance , but still drinks more than he should. Reports he infrequently takes the xanax    Endocrine: NO diabetes, Thyroid normal    Lymphatic: No lymphadenopathy, blood loss, anemia, or malignancy    Objective:      Blood pressure 126/74, pulse 74, temperature 97.6 °F (36.4 °C), temperature source Oral, height 5' 7" (1.702 m), weight 83.5 kg (184 lb), SpO2 95 %. Body mass index is 28.82 kg/m².  Physical Exam      General: Alert and attentive, cooperative and in no distress. Moderate lipoatrophy    Eyes: Pupils equal, round, reactive to light, anicteric, EOMI    Neck: Supple, non-tender, no thyromegaly or masses    ENT: EAC patent, TM normal, nares patent, no oral lesions, teeth in good condition, no thrush    Cardiovascular: Regular rate and rhythm, no murmurs, rubs, or gallop    Respiratory: Lungs clear without wheezes, no rales, rub or rhonci    Gastrointestinal: Active bowel sounds, soft, no mass or organomegaly, no tenderness or distention. Getting larger in the middle    Genitourinary: No  flank tenderness    Vascular: No peripheral edema, phlebitis, pulses normal. Warm and well perfused    Musculoskeletal: Ambulates without difficulty, no acute arthritis, synovitis or myositis. Normal muscle bulk and strength    Integumentary: Skin without rashes, lesions, or wounds. Does have a number of neurofibromas    AnusRectum:     Neurological: Normal LOC, cranial nerves, speech, reflexes, normal gait    Psychiatric: Normal mood, speech, demeanor    Lymphatic: No cervical, supraclavicular, axillary, or inguinal lymphadenopathy      Wound:     HIV Table:   HLA-B 5701   "   TOXOIgG  neg   RPR 4/3/2015 non reactive   HEP A IgG  vaccinated 2005, 2012   HBsAg 8/14/2015 neg, vacc 1980's, 2015 x3   HBsAb 6/19/17 neg, nonresponder after 2 series    HBcAb 8/18/2015 negative   HBeAb     HBeAg     HCVAb 8/14/2015 negative   HBV DNA     HCV RNA     Vitamin D 6/11/2017 normal   Chlamydia / GC 4/3/2015 negative   TB Gold  6/2017      negative  PSA   10/2017  WNL 2.7    Recent Diagnostics: lab is pending        Assessment and Plan:           HIV (human immunodeficiency virus infection)    Osteopenia, unspecified location    H/O neurofibromatosis    CINDY (obstructive sleep apnea)    Encounter for long-term (current) use of medications    Encounter for immunization    Other orders  -     Pneumococcal polysaccharide vaccine 23-valent greater than or equal to 1yo subcutaneous/IM; Future      [facility] at [facility]    Ask Dr. Trevino to order your MRI before his next visit with you    Go for lab next week    Reduce alcohol and alternate drinks with water. Reduce salt  Return in 6 months    Pneumovax (your last ever pneumonia vaccine)    This note was created using Dragon voice recognition software that occasionally misinterpreted phrases or words.

## 2018-12-14 ENCOUNTER — TELEPHONE (OUTPATIENT)
Dept: INFECTIOUS DISEASES | Facility: CLINIC | Age: 63
End: 2018-12-14

## 2018-12-14 DIAGNOSIS — M85.89 OTHER SPECIFIED DISORDERS OF BONE DENSITY AND STRUCTURE, MULTIPLE SITES: Primary | ICD-10-CM

## 2019-01-29 DIAGNOSIS — B20 HIV (HUMAN IMMUNODEFICIENCY VIRUS INFECTION): Primary | ICD-10-CM

## 2019-01-29 RX ORDER — EFAVIRENZ, EMTRICITABINE AND TENOFOVIR DISOPROXIL FUMARATE 600; 200; 300 MG/1; MG/1; MG/1
1 TABLET, FILM COATED ORAL NIGHTLY
Qty: 30 TABLET | Refills: 6 | Status: SHIPPED | OUTPATIENT
Start: 2019-01-29 | End: 2019-07-12

## 2019-01-29 NOTE — TELEPHONE ENCOUNTER
Needs atripla refilled.  Script may have to be changed because he is on Medicare part d now.  And he is not sure what his co-pay will be.    Joan Black Ma

## 2019-05-14 DIAGNOSIS — G47.00 INSOMNIA, UNSPECIFIED TYPE: ICD-10-CM

## 2019-05-14 RX ORDER — ALPRAZOLAM 0.5 MG/1
TABLET ORAL
Qty: 30 TABLET | Refills: 5 | Status: SHIPPED | OUTPATIENT
Start: 2019-05-14 | End: 2020-02-11 | Stop reason: SDUPTHER

## 2019-07-12 ENCOUNTER — OFFICE VISIT (OUTPATIENT)
Dept: INFECTIOUS DISEASES | Facility: CLINIC | Age: 64
End: 2019-07-12
Payer: MEDICARE

## 2019-07-12 VITALS
HEIGHT: 67 IN | HEART RATE: 80 BPM | WEIGHT: 188 LBS | TEMPERATURE: 99 F | OXYGEN SATURATION: 96 % | SYSTOLIC BLOOD PRESSURE: 124 MMHG | BODY MASS INDEX: 29.51 KG/M2 | DIASTOLIC BLOOD PRESSURE: 77 MMHG

## 2019-07-12 DIAGNOSIS — E78.2 MIXED HYPERLIPIDEMIA: ICD-10-CM

## 2019-07-12 DIAGNOSIS — E88.1 LIPODYSTROPHY ASSOCIATED WITH HUMAN IMMUNODEFICIENCY VIRUS INFECTION: ICD-10-CM

## 2019-07-12 DIAGNOSIS — B20 LIPODYSTROPHY ASSOCIATED WITH HUMAN IMMUNODEFICIENCY VIRUS INFECTION: ICD-10-CM

## 2019-07-12 DIAGNOSIS — E78.5 HYPERLIPIDEMIA, UNSPECIFIED HYPERLIPIDEMIA TYPE: ICD-10-CM

## 2019-07-12 DIAGNOSIS — B20 HIV (HUMAN IMMUNODEFICIENCY VIRUS INFECTION): Primary | ICD-10-CM

## 2019-07-12 DIAGNOSIS — M85.80 OSTEOPENIA, UNSPECIFIED LOCATION: ICD-10-CM

## 2019-07-12 DIAGNOSIS — G47.33 OSA (OBSTRUCTIVE SLEEP APNEA): ICD-10-CM

## 2019-07-12 DIAGNOSIS — Z23 ENCOUNTER FOR IMMUNIZATION: ICD-10-CM

## 2019-07-12 DIAGNOSIS — Z79.899 ENCOUNTER FOR LONG-TERM (CURRENT) USE OF MEDICATIONS: ICD-10-CM

## 2019-07-12 PROCEDURE — 99214 PR OFFICE/OUTPT VISIT, EST, LEVL IV, 30-39 MIN: ICD-10-PCS | Mod: ,,, | Performed by: INTERNAL MEDICINE

## 2019-07-12 PROCEDURE — 99214 OFFICE O/P EST MOD 30 MIN: CPT | Mod: ,,, | Performed by: INTERNAL MEDICINE

## 2019-07-12 NOTE — PROGRESS NOTES
Subjective:       Patient ID: Dmitry Epps is a 63 y.o. male.    Chief Complaint:: HIV Positive/AIDS    HPI  Since last visit. He did not see Dr. Trevino nor have his MRI , missed his appointment  Did not have lab as requested  Colonoscopy isn't until 2020  Has not reduced alcohol yet  Had yearly physical with Dr. De Paz in September and did not have his lab orders either. Prostate LOU was normal. Had a stomach bug that day.     7/12/19: just had a Squamous cell cancer removed from his forehead.   He saw Dr. Trevino and no additional MRI was needed, next visit in 1/2020, the previous MRI was stable.   Bone density still has osteopenia, and he is compliant with vitamin D and calcium  He has cut back on beer a little , but states he needs to cut back more. He is not exercising though his job is an active one.  He does not feel rested, but hasn't worn CPAP in years. He is open toseeing the sleep doctor again and trying a new device, etc.   He still feels bloated when eating, and acknowledges that he eats quickly.     Current Outpatient Medications:     ALPRAZolam (XANAX) 0.5 MG tablet, TK 1 T PO  QHS PRF NEEDED FOR SLEEP, Disp: 30 tablet, Rfl: 5    esomeprazole (NEXIUM) 20 MG capsule, Take 20 mg by mouth before breakfast., Disp: , Rfl:     mirtazapine (REMERON) 45 MG tablet, Take 45 mg by mouth nightly., Disp: , Rfl:     pravastatin (PRAVACHOL) 40 MG tablet, Take 40 mg by mouth once daily., Disp: , Rfl:     jlsfzhtwg-awlpylbf-kbdeyfl ala (BIKTARVY) -25 mg per tablet, Take 1 tablet by mouth once daily., Disp: 30 tablet, Rfl: 5    FLUZONE HIGH-DOSE 2018-19, PF, 180 mcg/0.5 mL vaccine, ADM 0.5ML IM UTD, Disp: , Rfl: 0  Review of patient's allergies indicates:   Allergen Reactions    Sulfa (sulfonamide antibiotics) Rash     Past Medical History:   Diagnosis Date    Allergic sinusitis     Chronic    Snell's esophagus     on EGD    Detached vitreous humor, bilateral     GERD (gastroesophageal reflux disease)          Giardia 2014    HIV infection 11/1992    H/o HPV and KS (prior meds:indiavir, com/nfv, then atripla 06/2013    Hyperlipidemia     Mild pulmonary hypertension 03/2010    Found on Echo    Neurofibromatosis     Olecranon bursitis, left elbow 10/2015    CINDY (obstructive sleep apnea)     Not using CPAP    Restless leg syndrome     Squamous cell carcinoma 07/2019    forehead    Abdul-Leeroy syndrome     cause unclear    Tinnitus    HIV  HPV  [place]  Neurofibromatosis  Restless leg syndrome  Abdul leeroy syndrome, cause unclear  Hyperlipidemia  GERD, Snell's esophagus  Detached vitreous bilateral  Olecranon bursitis, left elbow 10/2015,   Giardia 2014  Colon polyps  Squamous cell of forehead    Past Surgical History:   Procedure Laterality Date    Adenomatous Polyp  04/2007    ([place])    BIOPSY Left 12/14/2017    Performed by Josef Newton D[facility] at [facility]    COLONOSCOPY  03/2004    EXCISION-NEUROMA Left 12/14/2017    Performed by Josef Newton D[facility] at [facility]    INSERTION-IMPLANT Left 12/14/2017    Performed by Josef Newton D[facility] at [facility] OR    PROSTATE BIOPSY      Negative    Repair of Deviated Septum/Uvuloplasty  02/2011    Fatmata    TONSILLECTOMY      UVULECTOMY  2011        tonsillectomy, septum/uvuloplasty 2011, prostate biopsy, colonoscopy with adenomatous polyp    Social History     Socioeconomic History    Marital status: Single     Spouse name: Not on file    Number of children: Not on file    Years of education: Not on file    Highest education level: Not on file   Occupational History    Not on file   Social Needs    Financial resource strain: Not on file    Food insecurity:     Worry: Not on file     Inability: Not on file    Transportation needs:     Medical: Not on file     Non-medical: Not on file   Tobacco Use    Smoking status: Never Smoker    Smokeless tobacco: Never Used   Substance and Sexual Activity    Alcohol use: Yes     Comment: 3-5 times a week     Drug use: No    Sexual activity: Not on file   Lifestyle    Physical activity:     Days per week: Not on file     Minutes per session: Not on file    Stress: Not on file   Relationships    Social connections:     Talks on phone: Not on file     Gets together: Not on file     Attends Shinto service: Not on file     Active member of club or organization: Not on file     Attends meetings of clubs or organizations: Not on file     Relationship status: Not on file   Other Topics Concern    Not on file   Social History Narrative    Not on file     Family History   Problem Relation Age of Onset    Hypertension Mother     Hypertension Father     Neurofibromatosis Sister         Younger sister, different sister with glioblastoma    Diabetes Paternal Grandmother     Prostate cancer Paternal Uncle     Multiple family members with neurofibromatosis    Travel History:   Vaccine History: Yearly flu vaccine, Menactra 2011, typhoid 2011, Pneumovax 2007,2018 , Prevnar 2014, tdap 2016, hepatitis A 1 into 2005, Zostavax 2014, hepatitis B series nonresponder, repeated 2016 nonresponder, shingrix #1 and 2 2018  Advanced Directive:   Safer Sex: Abstinent  Bone Density: Osteopenia 2011, 2015, 12/2018  Colonoscopy: 2004, 2007, 2015      :Review of Systems    Constitutional: No fever, chills, sweats, fatigue, weakness, weight loss    Eyes: No change in vision, loss of vision, diplopia, photophobia. UTD eye exam    ENT: No sinus drainage, sore throat, mouth pain, or lesions. UTD dental exam    Cardiovascular: No chest pain, KEENAN, palpitations or pedal edema    Respiratory: No shortness of breath, KEENAN, cough, wheeze, sputum, pleurisy, or hemoptysis    Gastrointestinal: No abdominal pain, nausea, vomiting, diarrhea, constipation, blood in stool, or focal abd pain. Has not tried mylicon which I suggested in the past.     Genitourinary: No dysuria, hematuria, incontinence, frequency, flank pain,     Musculoskeletal: No new pain,  "joint swelling, or injuries,      Integumentary: No new rashes, lesions, or wounds    Vascular: no edema    Neurological: No unusual headaches, neuropathy, or falls.        Psychiatric: No anxiety, depression,  And drinking a little less    Endocrine: NO diabetes, Thyroid normal    Lymphatic: No lymphadenopathy, blood loss, anemia, or malignancy    Objective:      Blood pressure 124/77, pulse 80, temperature 98.5 °F (36.9 °C), temperature source Temporal, height 5' 7" (1.702 m), weight 85.3 kg (188 lb), SpO2 96 %. Body mass index is 29.44 kg/m².  Physical Exam      General: Alert and attentive, cooperative and in no distress. Moderate lipoatrophy    Eyes: Pupils equal, round, reactive to light, anicteric, EOMI    Neck: Supple, non-tender, no thyromegaly or masses    ENT: EAC patent, TM normal, nares patent, no oral lesions, teeth in good condition, no thrush    Cardiovascular: Regular rate and rhythm, no murmurs, rubs, or gallop    Respiratory: Lungs clear without wheezes, no rales, rub or rhonci    Gastrointestinal: Active bowel sounds, soft, no mass or organomegaly, no tenderness or distention. Getting larger in the middle    Genitourinary: No  flank tenderness    Vascular: No peripheral edema, phlebitis, pulses normal. Warm and well perfused    Musculoskeletal: Ambulates without difficulty, no acute arthritis, synovitis or myositis. Normal muscle bulk and strength    Integumentary: Skin without rashes, lesions, or wounds. Does have a number of neurofibromas    AnusRectum: per Dr. De Paz 2019    Neurological: Normal LOC, cranial nerves, speech, reflexes, normal gait. I find that he is a little fidgety/choreiform, but likely just his baseline    Psychiatric: Normal mood, speech, demeanor    Lymphatic: No cervical, supraclavicular, axillary, or inguinal lymphadenopathy      Wound:     HIV Table:   HLA-B 5701     TOXOIgG  neg   RPR 12/2018 non reactive   HEP A IgG  vaccinated 2005, 2012   HBsAg 8/14/2015 neg, vacc " 1980's, 2015 x3   HBsAb 6/19/17 neg, nonresponder after 2 series    HBcAb 8/18/2015 negative   HBeAb     HBeAg     HCVAb 12/2018 negative   HBV DNA     HCV RNA     Vitamin D 6/11/2017 normal   Chlamydia / GC 4/3/2015 negative   TB Gold  6/2017      negative  PSA   10/2017  WNL 2.7 He cannot recall having one since then. He will let me know     Recent Diagnostics: reviewed 12/2018 lab       Assessment and Plan:           HIV (human immunodeficiency virus infection)  -     CBC auto differential; Future; Expected date: 07/12/2019  -     Comprehensive metabolic panel; Future; Expected date: 07/12/2019  -     Lipid panel; Future; Expected date: 07/12/2019  -     HIV RNA, quantitative, PCR; Future; Expected date: 07/12/2019  -     Quantiferon Gold TB; Future; Expected date: 07/12/2019  -     Urinalysis; Future; Expected date: 07/12/2019    Osteopenia, unspecified location  -     Vitamin D; Future; Expected date: 07/12/2019    Encounter for long-term (current) use of medications  -     Urinalysis; Future; Expected date: 07/12/2019    Hyperlipidemia, unspecified hyperlipidemia type  -     Lipid panel; Future; Expected date: 07/12/2019    Encounter for immunization  -     Rubeola antibody IgG; Future; Expected date: 07/12/2019    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  Dispense: 30 tablet; Refill: 5      Fasting lab, soon    Next colonoscopy 11/2020  Next bone density 12/2020    Please make an appointment with the sleep doctor to give new sleep apnea options.     Flu vaccine in November, wherever its convenient    Return in 6 months with lab ahead     We are switching from Atripla to Biktarvy one daily. This will help your bone density, cholesterol and sleep    We may repeat the viral load a couple of months after  the change  This note was created using Dragon voice recognition software that occasionally misinterpreted phrases or words.

## 2019-07-12 NOTE — PATIENT INSTRUCTIONS
Fasting lab, soon    Next colonoscopy 11/2020  Next bone density 12/2020    Please make an appointment with the sleep doctor to give new sleep apnea options.     Flu vaccine in November, wherever its convenient    Return in 6 months with lab ahead     We are switching from Atripla to Biktarvy one daily    We may repeat the viral load a couple of months after the change

## 2019-10-14 ENCOUNTER — OFFICE VISIT (OUTPATIENT)
Dept: URGENT CARE | Facility: CLINIC | Age: 64
End: 2019-10-14
Payer: MEDICARE

## 2019-10-14 VITALS
OXYGEN SATURATION: 97 % | DIASTOLIC BLOOD PRESSURE: 80 MMHG | RESPIRATION RATE: 16 BRPM | SYSTOLIC BLOOD PRESSURE: 117 MMHG | BODY MASS INDEX: 29.51 KG/M2 | HEART RATE: 74 BPM | HEIGHT: 67 IN | WEIGHT: 188 LBS | TEMPERATURE: 98 F

## 2019-10-14 DIAGNOSIS — M25.572 ACUTE LEFT ANKLE PAIN: ICD-10-CM

## 2019-10-14 DIAGNOSIS — M25.532 LEFT WRIST PAIN: ICD-10-CM

## 2019-10-14 DIAGNOSIS — S52.572A OTHER CLOSED INTRA-ARTICULAR FRACTURE OF DISTAL END OF LEFT RADIUS, INITIAL ENCOUNTER: Primary | ICD-10-CM
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
## 2019-10-14 DIAGNOSIS — S00.83XA CONTUSION OF FACE, INITIAL ENCOUNTER: ICD-10-CM

## 2019-10-14 PROCEDURE — 73110 XR WRIST COMPLETE 3 VIEWS LEFT: ICD-10-PCS | Mod: FY,LT,S$GLB, | Performed by: RADIOLOGY

## 2019-10-14 PROCEDURE — 73110 X-RAY EXAM OF WRIST: CPT | Mod: FY,LT,S$GLB, | Performed by: RADIOLOGY

## 2019-10-14 PROCEDURE — 99203 PR OFFICE/OUTPT VISIT, NEW, LEVL III, 30-44 MIN: ICD-10-PCS | Mod: S$GLB,,, | Performed by: NURSE PRACTITIONER

## 2019-10-14 PROCEDURE — 73130 XR HAND COMPLETE 3 VIEW LEFT: ICD-10-PCS | Mod: FY,LT,S$GLB, | Performed by: RADIOLOGY

## 2019-10-14 PROCEDURE — 99203 OFFICE O/P NEW LOW 30 MIN: CPT | Mod: S$GLB,,, | Performed by: NURSE PRACTITIONER

## 2019-10-14 PROCEDURE — 73130 X-RAY EXAM OF HAND: CPT | Mod: FY,LT,S$GLB, | Performed by: RADIOLOGY

## 2019-10-14 RX ORDER — ALBUTEROL SULFATE 90 UG/1
AEROSOL, METERED RESPIRATORY (INHALATION)
COMMUNITY
End: 2020-01-17

## 2019-10-14 RX ORDER — HYDROCODONE BITARTRATE AND ACETAMINOPHEN 10; 325 MG/1; MG/1
TABLET ORAL
COMMUNITY
End: 2023-06-19

## 2019-10-14 RX ORDER — ALBUTEROL SULFATE 90 UG/1
AEROSOL, METERED RESPIRATORY (INHALATION)
COMMUNITY
End: 2019-10-14

## 2019-10-14 RX ORDER — FAMOTIDINE 40 MG/1
TABLET, FILM COATED ORAL
COMMUNITY
End: 2019-10-14

## 2019-10-14 RX ORDER — ERGOCALCIFEROL 1.25 MG/1
CAPSULE ORAL
COMMUNITY
End: 2023-06-19

## 2019-10-14 RX ORDER — TRAMADOL HYDROCHLORIDE 50 MG/1
50 TABLET ORAL EVERY 6 HOURS PRN
Qty: 20 TABLET | Refills: 0 | Status: SHIPPED | OUTPATIENT
Start: 2019-10-14 | End: 2019-10-19
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
## 2019-10-14 NOTE — PATIENT INSTRUCTIONS
"  Please follow up with your Primary Care Provider in the upcoming week for injuries sustained during your fall.    If you have any of the "911" symptoms listed below, please call 911 and go immediately to the Emergency Department.    Treating Wrist Fractures   A fractured bone starts to heal on its own right away. But a treatment called reduction may help you heal better. Reduction is a process that repositions your bones. The goal is to get them as close as possible to how they were before the fracture. Your doctor will use one or more methods of reduction.     An external fixator is a rigid bar that screws into the bone through tiny holes made in the skin. It holds the fractured segments of bone in place.   Closed reduction  If you have a clean break with little soft tissue damage, closed reduction will probably be used. Before the procedure, you may be given a light anesthetic to relax your muscles. Then your doctor manually readjusts the position of the broken bone. A splint or cast will be worn while you heal.     A plate with tiny screws helps keep the bone stable and in place.   Open reduction  If you have an open fracture (bone sticking out through the skin), badly misaligned sections of bone, or severe tissue injury, open reduction is likely. A general anesthetic may be used during the procedure to let you sleep and relax your muscles. Your doctor then makes one or more incisions to realign the bone and repair soft tissues. Pins, screws, plates, or a combination of implants may be used under the skin to hold the bone in place during healing. Another device that may be used is an external fixator, which holds the bones in the correct position, and is surgically placed on the outside of the skin.  The road to healing  Fractures take about 6 weeks or more to heal. Keeping your hand raised above your heart can control swelling, throbbing, and pain. Your doctor may prescribe medicine that can help reduce pain. " Dont remove a splint unless your doctor says you can. Call your doctor if your pain gets worse or if you notice any excess swelling or redness. Sometimes these implants, especially wires, may need to be removed after the fracture has healed.    Date Last Reviewed: 9/8/2015  © 5072-4884 [email or web address]. [address] [place] [address], [place] [id number]. All rights reserved. This information is not intended as a substitute for professional medical care. Always follow your healthcare professional's instructions.      Head Injury (Adult)    You have a head injury. It does not appear serious at this time. But symptoms of a more serious problem, such as a mild brain injury (concussion) or bruising or bleeding in the brain, may appear later. For this reason, you or someone caring for you will need to watch for the symptoms listed below. Once youre home, also be sure to follow any care instructions youre given.  Home care  Watch for the following symptoms  Seek emergency medical care if you have any of these symptoms over the next hours to days:   · Headache  · Nausea or vomiting  · Dizziness  · Sensitivity to light or noise  · Unusual sleepiness or grogginess  · Trouble falling asleep  · Personality changes  · Vision changes  · Memory loss  · Confusion  · Trouble walking or clumsiness  · Loss of consciousness (even for a short time)  · Inability to be awakened  · Stiff neck  · Weakness or numbness in any part of the body  · Seizures  General care  · If you were prescribed medicines for pain, use them as directed. Note: Dont take other medicines for pain without talking to your provider first.  · To help reduce swelling and pain, apply a cold source to the injured area for up to 20 minutes at a time. Do this as often as directed. Use a cold pack or bag of ice wrapped in a thin towel. Never apply a cold source directly to the skin.  · If you have cuts or scrapes as a result of your head injury, care for them as  directed.  · For the next 24 hours (or longer, if instructed):  ¨ Dont drink alcohol or use sedatives or other medicines that make you sleepy.  ¨ Dont drive or operate machinery.  ¨ Dont do anything strenuous, such as heavy lifting or straining.  ¨ Limit tasks that require concentration. This includes reading, using a smartphone or computer, watching TV, and playing video games.  ¨ Dont return to sports or other activities that could result in another head injury.  Follow-up care  Follow up with your healthcare provider, or as directed. If imaging tests were done, they will be reviewed by a doctor. You will be told the results and any new findings that may affect your care.  When to seek medical advice  Call your healthcare provider right away if any of these occur:  · Pain doesnt get better or worsens  · New or increased swelling or bruising  · Fever of 100.4°F (38°C) or higher, or as directed by your provider  · Increased redness, warmth, drainage, or bleeding from the injured area  · Fluid drainage or bleeding from the nose or ears  · Any depression or bony abnormality in the injured area  Date Last Reviewed: 9/26/2015  © 7231-9612 [employer]. [address] [place] [address], [place] [address]. All rights reserved. This information is not intended as a substitute for professional medical care. Always follow your healthcare professional's instructions.

## 2019-10-15 ENCOUNTER — TELEPHONE (OUTPATIENT)
Dept: ORTHOPEDICS | Facility: CLINIC | Age: 64
End: 2019-10-15

## 2019-10-15 NOTE — TELEPHONE ENCOUNTER
Voicemail left for patient to try and schedule an appointment here in the clinic.  Left a call back number for the patient.      ----- Message from Roula Cuellar sent at 10/15/2019  9:13 AM CDT -----  Patient was seen at [facility] on 10/14.  Patient has a fracture.  Please contact patient to schedule.    Other closed intra-articular fracture of distal end of left radius, initial encounter [S52.894Z]  Left wrist pain [M25.532

## 2019-10-17 ENCOUNTER — TELEPHONE (OUTPATIENT)
Dept: URGENT CARE | Facility: CLINIC | Age: 64
End: 2019-10-17

## 2019-11-11 LAB — MEV IGG SER IA-ACNC: <25 AU/ML

## 2019-11-13 LAB
25(OH)D3 SERPL-MCNC: 24 NG/ML (ref 30–100)
ALBUMIN SERPL-MCNC: 3.4 G/DL (ref 3.6–5.1)
ALBUMIN/GLOB SERPL: 1.4 (CALC) (ref 1–2.5)
ALP SERPL-CCNC: 77 U/L (ref 40–115)
ALT SERPL-CCNC: 16 U/L (ref 9–46)
APPEARANCE UR: CLEAR
AST SERPL-CCNC: 18 U/L (ref 10–35)
BASOPHILS # BLD AUTO: 60 CELLS/UL (ref 0–200)
BASOPHILS NFR BLD AUTO: 1 %
BILIRUB SERPL-MCNC: 0.4 MG/DL (ref 0.2–1.2)
BILIRUB UR QL STRIP: NEGATIVE
BUN SERPL-MCNC: 20 MG/DL (ref 7–25)
BUN/CREAT SERPL: ABNORMAL (CALC) (ref 6–22)
CALCIUM SERPL-MCNC: 8.9 MG/DL (ref 8.6–10.3)
CHLORIDE SERPL-SCNC: 109 MMOL/L (ref 98–110)
CHOLEST SERPL-MCNC: 136 MG/DL
CHOLEST/HDLC SERPL: 3.9 (CALC)
CO2 SERPL-SCNC: 25 MMOL/L (ref 20–32)
COLOR UR: YELLOW
CREAT SERPL-MCNC: 0.76 MG/DL (ref 0.7–1.25)
EOSINOPHIL # BLD AUTO: 222 CELLS/UL (ref 15–500)
EOSINOPHIL NFR BLD AUTO: 3.7 %
ERYTHROCYTE [DISTWIDTH] IN BLOOD BY AUTOMATED COUNT: 13 % (ref 11–15)
GAMMA INTERFERON BACKGROUND BLD IA-ACNC: 0.09 IU/ML
GFRSERPLBLD MDRD-ARVRAT: 95 ML/MIN/1.73M2
GLOBULIN SER CALC-MCNC: 2.5 G/DL (CALC) (ref 1.9–3.7)
GLUCOSE SERPL-MCNC: 99 MG/DL (ref 65–99)
GLUCOSE UR QL STRIP: NEGATIVE
HCT VFR BLD AUTO: 44.3 % (ref 38.5–50)
HDLC SERPL-MCNC: 35 MG/DL
HGB BLD-MCNC: 14.3 G/DL (ref 13.2–17.1)
HGB UR QL STRIP: NEGATIVE
HIV1 RNA # SERPL NAA+PROBE: NORMAL COPIES/ML
HIV1 RNA SERPL NAA+PROBE-LOG#: NORMAL LOG COPIES/ML
KETONES UR QL STRIP: NEGATIVE
LDLC SERPL CALC-MCNC: 73 MG/DL (CALC)
LEUKOCYTE ESTERASE UR QL STRIP: NEGATIVE
LYMPHOCYTES # BLD AUTO: 2196 CELLS/UL (ref 850–3900)
LYMPHOCYTES NFR BLD AUTO: 36.6 %
M TB IFN-G BLD-IMP: NEGATIVE
M TB IFN-G CD4+ BCKGRND COR BLD-ACNC: 0.02 IU/ML
MCH RBC QN AUTO: 28.8 PG (ref 27–33)
MCHC RBC AUTO-ENTMCNC: 32.3 G/DL (ref 32–36)
MCV RBC AUTO: 89.3 FL (ref 80–100)
MITOGEN IGNF BCKGRD COR BLD-ACNC: >10 IU/ML
MONOCYTES # BLD AUTO: 792 CELLS/UL (ref 200–950)
MONOCYTES NFR BLD AUTO: 13.2 %
NEUTROPHILS # BLD AUTO: 2730 CELLS/UL (ref 1500–7800)
NEUTROPHILS NFR BLD AUTO: 45.5 %
NITRITE UR QL STRIP: NEGATIVE
NONHDLC SERPL-MCNC: 101 MG/DL (CALC)
PH UR STRIP: 6.5 [PH] (ref 5–8)
PLATELET # BLD AUTO: 221 THOUSAND/UL (ref 140–400)
PMV BLD REES-ECKER: 10 FL (ref 7.5–12.5)
POTASSIUM SERPL-SCNC: 4.2 MMOL/L (ref 3.5–5.3)
PROT SERPL-MCNC: 5.9 G/DL (ref 6.1–8.1)
PROT UR QL STRIP: NEGATIVE
RBC # BLD AUTO: 4.96 MILLION/UL (ref 4.2–5.8)
SODIUM SERPL-SCNC: 142 MMOL/L (ref 135–146)
SP GR UR STRIP: 1.03 (ref 1–1.03)
TB2 - NIL: 0 IU/ML
TRIGL SERPL-MCNC: 186 MG/DL
WBC # BLD AUTO: 6 THOUSAND/UL (ref 3.8–10.8)

## 2019-11-15 ENCOUNTER — TELEPHONE (OUTPATIENT)
Dept: INFECTIOUS DISEASES | Facility: CLINIC | Age: 64
End: 2019-11-15

## 2019-11-15 NOTE — TELEPHONE ENCOUNTER
----- Message from Verona Kimball MD sent at 11/15/2019 12:15 PM CST -----  Let him know labs look good but he needs a measles booster(MMR) here or at the pharmacy of his choice, and he needs to take 5000 IU of vitamin D pe rday

## 2019-11-15 NOTE — TELEPHONE ENCOUNTER
Patient informed of results, the need for a Measle's booster and to take a least 5000 units of Vit D Daily.    Joan Black Ma

## 2019-12-26 ENCOUNTER — OFFICE VISIT (OUTPATIENT)
Dept: URGENT CARE | Facility: CLINIC | Age: 64
End: 2019-12-26
Payer: MEDICARE

## 2019-12-26 VITALS
SYSTOLIC BLOOD PRESSURE: 117 MMHG | TEMPERATURE: 97 F | WEIGHT: 188 LBS | BODY MASS INDEX: 29.51 KG/M2 | OXYGEN SATURATION: 96 % | DIASTOLIC BLOOD PRESSURE: 73 MMHG | RESPIRATION RATE: 16 BRPM | HEART RATE: 84 BPM | HEIGHT: 67 IN

## 2019-12-26 DIAGNOSIS — Z77.098 CHEMICAL EXPOSURE: Primary | ICD-10-CM

## 2019-12-26 PROCEDURE — 99214 OFFICE O/P EST MOD 30 MIN: CPT | Mod: S$GLB,,, | Performed by: EMERGENCY MEDICINE

## 2019-12-26 PROCEDURE — 99214 PR OFFICE/OUTPT VISIT, EST, LEVL IV, 30-39 MIN: ICD-10-PCS | Mod: S$GLB,,, | Performed by: EMERGENCY MEDICINE
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
## 2019-12-26 NOTE — PATIENT INSTRUCTIONS
Go home, get out of clothes, get into shower for 15-25 minutes.    Tyrell Faye MD  Go to the Emergency Department for any problems  Call your PCP for follow up next available.    Chemical Exposure: Skin  Your skin has been exposed to a chemical. The effects of chemicals on the skin range from mild irritation and redness to a severe burn. The seriousness of the injury depends on the type of chemical, how concentrated it was, and how long it was on your skin.  It is common to have some irritation for 24 hours after the exposure. This is the case even if the exposure was mild. If the exposure was more serious, be sure to follow up with your healthcare provider as directed.  Home care  · You may apply a cool compress (a towel soaked in ice water) to the affected area 3 to 4 times a day. This will help reduce pain and swelling.  · If a dressing (a pad covering the injured area) was applied, change it every 24 hours and watch for the warning signs listed below.  · You may use acetaminophen or ibuprofen to control pain unless another pain medicine was prescribed. (Note: If you have liver disease, or if you have ever had a stomach ulcer or gastrointestinal bleeding, or if you are taking blood-thinning medicines, talk with your healthcare provider before using these pain medicines.)  · Try not to pick, scratch, or rub at your injury. This may make the injury worse or cause an infection.  Follow-up care  Follow up with your healthcare provider, or as advised.  When to seek medical advice  Call your healthcare provider right away if any of the following occur:  · Increased swelling or pain  · Severe blistering  · Increasing redness  · Fluid drainage from the skin  Call 911  Call emergency services right away if any of these occur:  · Trouble breathing or swallowing  · Extreme drowsiness or trouble awakening  · Severe confusion  · Seizures  · Fainting  Date Last Reviewed: 2/1/2017  © 9979-8213 [employer] [address]  [place] [address]. All rights reserved. This information is not intended as a substitute for professional medical care. Always follow your healthcare professional's instructions.

## 2019-12-29 ENCOUNTER — TELEPHONE (OUTPATIENT)
Dept: URGENT CARE | Facility: CLINIC | Age: 64
End: 2019-12-29

## 2020-01-17 ENCOUNTER — OFFICE VISIT (OUTPATIENT)
Dept: INFECTIOUS DISEASES | Facility: CLINIC | Age: 65
End: 2020-01-17
Payer: MEDICARE

## 2020-01-17 VITALS
BODY MASS INDEX: 29.35 KG/M2 | DIASTOLIC BLOOD PRESSURE: 70 MMHG | HEART RATE: 76 BPM | HEIGHT: 67 IN | SYSTOLIC BLOOD PRESSURE: 122 MMHG | TEMPERATURE: 98 F | WEIGHT: 187 LBS | OXYGEN SATURATION: 96 %

## 2020-01-17 DIAGNOSIS — R55 SYNCOPE, UNSPECIFIED SYNCOPE TYPE: ICD-10-CM

## 2020-01-17 DIAGNOSIS — R42 VERTIGO: ICD-10-CM

## 2020-01-17 DIAGNOSIS — M85.80 OSTEOPENIA, UNSPECIFIED LOCATION: ICD-10-CM

## 2020-01-17 DIAGNOSIS — B20 LIPODYSTROPHY ASSOCIATED WITH HUMAN IMMUNODEFICIENCY VIRUS INFECTION: ICD-10-CM

## 2020-01-17 DIAGNOSIS — E55.9 VITAMIN D DEFICIENCY: ICD-10-CM

## 2020-01-17 DIAGNOSIS — Q85.00: ICD-10-CM

## 2020-01-17 DIAGNOSIS — E88.1 LIPODYSTROPHY ASSOCIATED WITH HUMAN IMMUNODEFICIENCY VIRUS INFECTION: ICD-10-CM

## 2020-01-17 DIAGNOSIS — E78.5 HYPERLIPIDEMIA, UNSPECIFIED HYPERLIPIDEMIA TYPE: ICD-10-CM

## 2020-01-17 DIAGNOSIS — B20 HIV INFECTION, UNSPECIFIED SYMPTOM STATUS: Primary | ICD-10-CM

## 2020-01-17 DIAGNOSIS — G47.33 OSA (OBSTRUCTIVE SLEEP APNEA): ICD-10-CM

## 2020-01-17 DIAGNOSIS — Z79.899 ENCOUNTER FOR LONG-TERM (CURRENT) USE OF MEDICATIONS: ICD-10-CM

## 2020-01-17 PROCEDURE — 1159F PR MEDICATION LIST DOCUMENTED IN MEDICAL RECORD: ICD-10-PCS | Mod: S$GLB,,, | Performed by: INTERNAL MEDICINE

## 2020-01-17 PROCEDURE — 1126F AMNT PAIN NOTED NONE PRSNT: CPT | Mod: S$GLB,,, | Performed by: INTERNAL MEDICINE

## 2020-01-17 PROCEDURE — 99215 PR OFFICE/OUTPT VISIT, EST, LEVL V, 40-54 MIN: ICD-10-PCS | Mod: S$GLB,,, | Performed by: INTERNAL MEDICINE

## 2020-01-17 PROCEDURE — 99215 OFFICE O/P EST HI 40 MIN: CPT | Mod: S$GLB,,, | Performed by: INTERNAL MEDICINE

## 2020-01-17 PROCEDURE — 1159F MED LIST DOCD IN RCRD: CPT | Mod: S$GLB,,, | Performed by: INTERNAL MEDICINE

## 2020-01-17 PROCEDURE — 1126F PR PAIN SEVERITY QUANTIFIED, NO PAIN PRESENT: ICD-10-PCS | Mod: S$GLB,,, | Performed by: INTERNAL MEDICINE

## 2020-01-17 RX ORDER — CLOBETASOL PROPIONATE 0.5 MG/G
CREAM TOPICAL
Refills: 1 | COMMUNITY
Start: 2019-10-15 | End: 2020-12-17

## 2020-01-17 RX ORDER — BICTEGRAVIR SODIUM, EMTRICITABINE, AND TENOFOVIR ALAFENAMIDE FUMARATE 50; 200; 25 MG/1; MG/1; MG/1
TABLET ORAL
COMMUNITY
Start: 2019-12-16 | End: 2021-06-14 | Stop reason: SDUPTHER

## 2020-01-17 RX ORDER — FERROUS SULFATE, DRIED 160(50) MG
1 TABLET, EXTENDED RELEASE ORAL 2 TIMES DAILY WITH MEALS
COMMUNITY

## 2020-01-17 RX ORDER — ERGOCALCIFEROL 1.25 MG/1
50000 CAPSULE ORAL
Qty: 12 CAPSULE | Refills: 2 | Status: SHIPPED | OUTPATIENT
Start: 2020-01-17 | End: 2020-11-04 | Stop reason: SDUPTHER

## 2020-01-17 NOTE — PATIENT INSTRUCTIONS
Please try harder to drink fluids in the evening, and drink sports drinks with electrolytes regularly for a few days to see if this helps the lightheadedness.   Please discuss the vertigo with Dr. Trevino. Perhaps he will repeat your MRI    Make an appointment with sleep doctor  It is ok to reduce the mirtazipine and stop it if you wish  Reduce alcohol    Vitamin D 57894 units once a week    Return in 6 months

## 2020-02-11 DIAGNOSIS — G47.00 INSOMNIA, UNSPECIFIED TYPE: ICD-10-CM

## 2020-02-11 NOTE — TELEPHONE ENCOUNTER
----- Message from Ines Rdz sent at 2/11/2020  1:44 PM CST -----  Needs refill of Alprazolam sent to [facility] on [address] in [address] (?)

## 2020-02-12 RX ORDER — ALPRAZOLAM 0.5 MG/1
TABLET ORAL
Qty: 30 TABLET | Refills: 5 | Status: SHIPPED | OUTPATIENT
Start: 2020-02-12 | End: 2020-12-29 | Stop reason: SDUPTHER

## 2020-05-26 DIAGNOSIS — R12 HEARTBURN: Primary | ICD-10-CM

## 2020-05-26 RX ORDER — HYDROGEN PEROXIDE 3 %
20 SOLUTION, NON-ORAL MISCELLANEOUS
Qty: 90 CAPSULE | Refills: 1 | Status: SHIPPED | OUTPATIENT
Start: 2020-05-26 | End: 2020-11-30 | Stop reason: SDUPTHER

## 2020-06-18 ENCOUNTER — OFFICE VISIT (OUTPATIENT)
Dept: INFECTIOUS DISEASES | Facility: CLINIC | Age: 65
End: 2020-06-18
Payer: MEDICARE

## 2020-06-18 VITALS
DIASTOLIC BLOOD PRESSURE: 80 MMHG | WEIGHT: 187 LBS | HEART RATE: 76 BPM | SYSTOLIC BLOOD PRESSURE: 118 MMHG | OXYGEN SATURATION: 100 % | TEMPERATURE: 98 F | BODY MASS INDEX: 29.29 KG/M2

## 2020-06-18 DIAGNOSIS — R60.0 LOCALIZED EDEMA: ICD-10-CM

## 2020-06-18 DIAGNOSIS — E78.5 HYPERLIPIDEMIA, UNSPECIFIED HYPERLIPIDEMIA TYPE: ICD-10-CM

## 2020-06-18 DIAGNOSIS — B20 LIPODYSTROPHY ASSOCIATED WITH HUMAN IMMUNODEFICIENCY VIRUS INFECTION: ICD-10-CM

## 2020-06-18 DIAGNOSIS — Q85.00 NEUROFIBROMATOSIS: ICD-10-CM

## 2020-06-18 DIAGNOSIS — Z79.899 ENCOUNTER FOR LONG-TERM (CURRENT) USE OF MEDICATIONS: ICD-10-CM

## 2020-06-18 DIAGNOSIS — Q85.00: ICD-10-CM

## 2020-06-18 DIAGNOSIS — M85.80 OSTEOPENIA, UNSPECIFIED LOCATION: ICD-10-CM

## 2020-06-18 DIAGNOSIS — G47.33 OSA (OBSTRUCTIVE SLEEP APNEA): ICD-10-CM

## 2020-06-18 DIAGNOSIS — E88.1 LIPODYSTROPHY ASSOCIATED WITH HUMAN IMMUNODEFICIENCY VIRUS INFECTION: ICD-10-CM

## 2020-06-18 DIAGNOSIS — Z71.89 EDUCATED ABOUT COVID-19 VIRUS INFECTION: ICD-10-CM

## 2020-06-18 DIAGNOSIS — E55.9 VITAMIN D DEFICIENCY: ICD-10-CM

## 2020-06-18 DIAGNOSIS — S22.42XA CLOSED FRACTURE OF MULTIPLE RIBS OF LEFT SIDE, INITIAL ENCOUNTER: ICD-10-CM

## 2020-06-18 DIAGNOSIS — M84.68XA PATHOLOGICAL FRACTURE IN OTHER DISEASE, OTHER SITE, INITIAL ENCOUNTER FOR FRACTURE: ICD-10-CM

## 2020-06-18 DIAGNOSIS — B20 HIV INFECTION, UNSPECIFIED SYMPTOM STATUS: Primary | ICD-10-CM

## 2020-06-18 PROCEDURE — 99215 PR OFFICE/OUTPT VISIT, EST, LEVL V, 40-54 MIN: ICD-10-PCS | Mod: S$GLB,,, | Performed by: INTERNAL MEDICINE

## 2020-06-18 PROCEDURE — 99215 OFFICE O/P EST HI 40 MIN: CPT | Mod: S$GLB,,, | Performed by: INTERNAL MEDICINE

## 2020-06-18 RX ORDER — RISEDRONATE SODIUM 35 MG/1
TABLET, FILM COATED ORAL
Qty: 12 TABLET | Refills: 3 | Status: SHIPPED | OUTPATIENT
Start: 2020-06-18 | End: 2023-11-20 | Stop reason: SDUPTHER

## 2020-06-18 NOTE — PATIENT INSTRUCTIONS
Please schedule bone density test at [facility]  Please continue calcium and vitamin D  I would like to start you on a weekly medication to help restore bone density: Actonel (risedronate) 35 mg per week, on an empty stomach with a large glass of water    Please schedule your colonoscopy through Dr. Trevizo's office, Dr. Neela Ho    Please stop taking sodium bicarbonate  Low salt and low carb diet will help relieve the fluid as well  Wearing compression stockings (knee high) will retard the accumulation of fluid in your legs during the day    Lab this week or next, fasting    Flu vaccine in oct/nov    Return in 6 months

## 2020-06-18 NOTE — PROGRESS NOTES
Subjective:       Patient ID: Dmitry Epps is a 64 y.o. male.    Chief Complaint:: HIV Positive     Since last visit. He did not see Dr. Trevino nor have his MRI , missed his appointment  Did not have lab as requested  Colonoscopy isn't until 2020  Has not reduced alcohol yet  Had yearly physical with Dr. De Paz in September and did not have his lab orders either. Prostate LOU was normal. Had a stomach bug that day.     7/12/19: just had a Squamous cell cancer removed from his forehead.   He saw Dr. Trevino and no additional MRI was needed, next visit in 1/2020, the previous MRI was stable.   Bone density still has osteopenia, and he is compliant with vitamin D and calcium  He has cut back on beer a little , but states he needs to cut back more. He is not exercising though his job is an active one.  He does not feel rested, but hasn't worn CPAP in years. He is open toseeing the sleep doctor again and trying a new device, etc.   He still feels bloated when eating, and acknowledges that he eats quickly.     1/17/20: cutting back on beer even more. He did join an exercise club that started last night, with a goal of losing 40-45#, BMI 29. Did not make an appointment with sleep doctor, but he plans to. He     Had a syncopal episode about 3 months ago. His prior cardiologist retired and has seen Dr. Turner. He cannot tell me that he told the cardiologist and his notes do not reflect this but subsequently remembers telling the cardiologist that he had lightheadedness.. Had a fracture of the left wrist and had bumps on his head. He went to an urgent care.  He was referred to orthopedist, Dr. Montanez.  He had difficulty getting adequate pain relief.  The cardiologist did an echo and a stress test which were ok, at [facility]. He has episodes of lightheadedness and pre-syncope and mostly when he is getting up from bed in the morning. There are times when he describes vertigo too with associated changes in position or changes of  his head or movements of his eye. Changing from atripla to biktarvy has not been associated with a change (might have expected this on atripla but not Biktarvy). He has not noted a pattern, ie after an evening of beer and not drinking enough fluids but he may keep a diary of this.    Reviewed 11/2019 lab and vitamin D low, viral load undetectable. Lipids were better, PSA was fine. Switched to biktarvy in September. Did receive a flu vaccine and a MMR booster.     6/18/20: he has continued to work through the COVID pandemic, but he is protecting himself in public places, except [employer]. His gym closed and he gained back the weight that he had lost. During the work outs he fractured 7-9 ribs (no fall)his BMD in 12/2018 was roughly the same as the test in 2015    Current Outpatient Medications:     ALPRAZolam (XANAX) 0.5 MG tablet, TK 1 T PO  QHS PRF NEEDED FOR SLEEP, Disp: 30 tablet, Rfl: 5    BIKTARVY -25 mg per tablet, , Disp: , Rfl:     calcium-vitamin D3 (CALCIUM 500 + D) 500 mg(1,250mg) -200 unit per tablet, Take 1 tablet by mouth 2 (two) times daily with meals., Disp: , Rfl:     ergocalciferol (VITAMIN D2) 50,000 unit Cap, Take 1 capsule (50,000 Units total) by mouth every 7 days., Disp: 12 capsule, Rfl: 2    esomeprazole (NEXIUM) 20 MG capsule, Take 1 capsule (20 mg total) by mouth before breakfast., Disp: 90 capsule, Rfl: 1    FLUZONE HIGH-DOSE 2018-19, PF, 180 mcg/0.5 mL vaccine, ADM 0.5ML IM UTD, Disp: , Rfl: 0    FLUZONE HIGH-DOSE 2019-20, PF, 180 mcg/0.5 mL Syrg, ADM 0.5ML IM UTD, Disp: , Rfl: 0    mirtazapine (REMERON) 45 MG tablet, Take 45 mg by mouth nightly., Disp: , Rfl:     pravastatin (PRAVACHOL) 40 MG tablet, Take 40 mg by mouth once daily., Disp: , Rfl:     clobetasol (TEMOVATE) 0.05 % cream, APPLY TO AFFECTED AREA TWICE DAILY AS NEEDED FOR ITCH, Disp: , Rfl: 1    ergocalciferol (ERGOCALCIFEROL) 50,000 unit Cap, ergocalciferol (vitamin D2) 50,000 unit capsule, Disp: ,  Rfl:     HYDROcodone-acetaminophen (NORCO)  mg per tablet, hydrocodone 10 mg-acetaminophen 325 mg tablet, Disp: , Rfl:     risedronate (ACTONEL) 35 MG tablet, Take one tablet weekly on an empty stomach, with a large glass of water, and do not recline for 30 minutes., Disp: 12 tablet, Rfl: 3  Review of patient's allergies indicates:   Allergen Reactions    Sulfa (sulfonamide antibiotics) Rash     Past Medical History:   Diagnosis Date    Allergic sinusitis     Chronic    Snell's esophagus     on EGD    Colon polyps     Detached vitreous humor, bilateral     GERD (gastroesophageal reflux disease)     HH    Giardia 2014    HIV infection 11/1992    H/o HPV and KS (prior meds:indiavir, com/nfv, then atripla 06/2013    Hyperlipidemia     Mild pulmonary hypertension 03/2010    Found on Echo    Neurofibromatosis     Olecranon bursitis, left elbow 10/2015    CINDY (obstructive sleep apnea)     Not using CPAP    Restless leg syndrome     Rib fractures 2020    Squamous cell carcinoma 07/2019    forehead    Abdul-Leeroy syndrome     cause unclear    Tinnitus     Vitamin D deficiency         Past Surgical History:   Procedure Laterality Date    Adenomatous Polyp  04/2007    ([facility])    COLONOSCOPY  03/2004    FOOT NEUROMA SURGERY      PROSTATE BIOPSY      Negative    Repair of Deviated Septum/Uvuloplasty  02/2011    [place]    TONSILLECTOMY      UVULECTOMY  2011         , colonoscopy with adenomatous polyp    Social History     Socioeconomic History    Marital status: Single     Spouse name: Not on file    Number of children: Not on file    Years of education: Not on file    Highest education level: Not on file   Occupational History    Not on file   Social Needs    Financial resource strain: Not on file    Food insecurity     Worry: Not on file     Inability: Not on file    Transportation needs     Medical: Not on file     Non-medical: Not on file   Tobacco Use    Smoking status:  Never Smoker    Smokeless tobacco: Never Used   Substance and Sexual Activity    Alcohol use: Yes     Comment: 3-5 times a week    Drug use: No    Sexual activity: Not on file   Lifestyle    Physical activity     Days per week: Not on file     Minutes per session: Not on file    Stress: Not on file   Relationships    Social connections     Talks on phone: Not on file     Gets together: Not on file     Attends [employer] service: Not on file     Active member of club or organization: Not on file     Attends meetings of clubs or organizations: Not on file     Relationship status: Not on file   Other Topics Concern    Not on file   Social History Narrative    Not on file     Family History   Problem Relation Age of Onset    Hypertension Mother     Hypertension Father     Neurofibromatosis Sister         Younger sister, different sister with glioblastoma    Diabetes Paternal Grandmother     Prostate cancer Paternal Uncle     Multiple family members with neurofibromatosis    Travel History:   Vaccine History: Yearly flu vaccine, Menactra 2011, typhoid 2011, Pneumovax 2007,2018 , Prevnar 2014, tdap 2016, hepatitis A 1 into 2005, Zostavax 2014, hepatitis B series nonresponder, repeated 2016 nonresponder, shingrix #1 and 2 2018, MMR 11/2019  Advanced Directive:   Safer Sex: Abstinent  Bone Density: Osteopenia 2011, 2015, 12/2018  Colonoscopy: 2004, 2007, 2015      :Review of Systems    Constitutional: No fever, chills, sweats, fatigue, weakness, weight loss    Eyes: No change in vision, loss of vision, diplopia, photophobia. UTD eye exam    ENT: No sinus drainage, sore throat, mouth pain, or lesions. UTD dental exam    Cardiovascular: No chest pain, occasional  KEENAN, palpitations  And has been having more LE edema. He was taking sodium bicarbonate mid April -2 days ago, because he heard that changing his pH would help prevent coronavirus, but he stopped 2 days ago. No orthopnea. Normal EF 12/2019. Sees his  cardiologist in July    Respiratory: No shortness of breath,    Cough, chest congestion. Occasional dyspnea when exerting, not surprising   Had another sleep study done in 2/2020 and is using an AVS? And he has been using it since mid march and has been very compliant, wearing 5-6 hours per night. He does not feel more rested.     Gastrointestinal: No abdominal pain, nausea, vomiting, diarrhea, constipation, due for colonoscopy. .     Genitourinary: No dysuria, hematuria, incontinence, frequency, flank pain,     Musculoskeletal: No new pain, joint swelling, and fracture of left wrist, which is now healed but still bothers him when he exercises. Found to have rib fractures, and does not know how these occurred. Denies fall. Wondered if it was while he was doing exercise class at gym.. Legs hurt for a month, more than baseline, may be related to the xtra swelling. Occasional aleve. His wrist (recent fracture) was more uncomfortable while on bicarb too.     Integumentary:  No new skin lesions. Saw his dermatologist 2 days ago for annual skin check. One lesion was frozen    Vascular: no Claudication or major varicosities    Neurological: No unusual headaches, neuropathy, or falls. No further orthostatics symptoms and was having a little vertigo. He saw his neurologist and last MRI was last year. .these have not recurred  Psychiatric: No anxiety, depression,  And drinking about the same    Endocrine: NO diabetes, Thyroid normal    Lymphatic: No lymphadenopathy, blood loss, anemia, or malignancy    Objective:      Blood pressure 118/80, pulse 76, temperature 98.3 °F (36.8 °C), weight 84.8 kg (187 lb), SpO2 100 %. Body mass index is 29.29 kg/m².  Physical Exam      General: Alert and attentive, cooperative and in no distress. Moderate lipoatrophy    Eyes: Pupils equal, round, reactive to light, anicteric, EOMI  .    Neck: Supple, non-tender, no thyromegaly or masses. No JVD    ENT: EAC patent, TM normal, nares patent, no  oral lesions, teeth in good condition, no thrush    Cardiovascular: Regular rate and rhythm, no murmurs, rubs, or gallop    Respiratory: Lungs clear without wheezes, no rales, rub or rhonci.   Gastrointestinal: Active bowel sounds, soft, no mass or organomegaly, no tenderness or distention. Getting larger in the middle    Genitourinary: No  flank tenderness    Vascular: moderate LE edema,no  phlebitis, pulses normal. Warm and well perfused, no calf or popliteal tenderness, no cords. Mild varicosities    Musculoskeletal: Ambulates without difficulty, no acute arthritis, synovitis or myositis. Normal muscle bulk and strength    Integumentary: Skin without rashes  Does have a number of neurofibromas.       AnusRectum: per Dr. De Paz 2019    Neurological: Normal LOC, cranial nerves, speech, reflexes, normal gait. I find that he is less fidgety/choreiform, but likely just his baseline.    Psychiatric: Normal mood, speech, demeanor    Lymphatic: No cervical, supraclavicular, axillary, or inguinal lymphadenopathy      Wound:     HIV Table:   HLA-B 5701     TOXOIgG  neg   RPR 12/2018 non reactive   HEP A IgG  vaccinated 2005, 2012   HBsAg 8/14/2015 neg, vacc 1980's, 2015 x3   HBsAb 6/19/17 neg, nonresponder after 2 series    HBcAb 8/18/2015 negative   HBeAb     HBeAg     HCVAb 12/2018 negative   HBV DNA     HCV RNA     Vitamin D 6/11/2017 normal   Chlamydia / GC 4/3/2015 negative   TB Gold  11/2019     negative  PSA   11/8/19   3.0      Results for ALVARADO CORDOVA (MRN [id number]) as of 6/18/2020 22:02   Ref. Range 11/8/2019 07:12   Rubeola IgG Latest Units: AU/mL <25.00 (L)     Recent Diagnostics: reviewed  12/31/19 TTE   1. Normal left ventricular systolic function.   2. Mildly elevated systolic pulmonary artery pressure.  37   3. Right ventricular systolic function appears normal.    12/31/19 ETT  1. Normal control electrocardiogram.  2. Treadmill stress ECG is negative for ischemia.  3. Functional capacity is good for  age.    Assessment and Plan:           HIV infection, unspecified symptom status  -     CBC auto differential; Future; Expected date: 06/18/2020  -     Comprehensive metabolic panel; Future; Expected date: 06/18/2020  -     HIV RNA, quantitative, PCR; Future; Expected date: 06/18/2020  -     RPR; Future; Expected date: 06/18/2020  -     Urinalysis; Future; Expected date: 06/18/2020    Osteopenia, unspecified location  -     DXA Bone Density Spine And Hip; Future  -     Vitamin D; Future; Expected date: 06/18/2020    Closed fracture of multiple ribs of left side, initial encounter  -     DXA Bone Density Spine And Hip; Future    Pathological fracture in other disease, other site, initial encounter for fracture   -     DXA Bone Density Spine And Hip; Future    Hyperlipidemia, unspecified hyperlipidemia type  -     Lipid Panel; Future; Expected date: 06/18/2020    Vitamin D deficiency  -     Vitamin D; Future; Expected date: 06/18/2020    Localized edema  -     Urinalysis; Future; Expected date: 06/18/2020  -     Brain natriuretic peptide; Future; Expected date: 06/18/2020    Neurofibromatosis    Encounter for long-term (current) use of medications    CINDY (obstructive sleep apnea)    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  Dispense: 12 tablet; Refill: 3    Long talk about osteopenia, compared prior BMD studies and the numbers are not much different but he has fractured x 2 in the last year.  Please schedule bone density test at [facility]  Please continue calcium and vitamin D  I would like to start you on a weekly medication to help restore bone density: Actonel (risedronate) 35 mg per week, on an empty stomach with a large glass of water    Please  schedule your colonoscopy through Dr. Trevizo's office, Dr. Neela Ho    Please stop taking sodium bicarbonate(long talk about fluid retention)  Low salt and low carb diet will help relieve the fluid as well  Wearing compression stockings (knee high) will retard the accumulation of fluid in your legs during the day    Lab this week or next, fasting    Flu vaccine in oct/nov    Return in 6 months    Return in 6 monthst occasionally misinterpreted phrases or words.

## 2020-11-04 DIAGNOSIS — E55.9 VITAMIN D DEFICIENCY: Primary | ICD-10-CM

## 2020-11-04 NOTE — TELEPHONE ENCOUNTER
Asked me to email him a copy cause he thinks he misplaced it.    Joan Black, [facility]

## 2020-11-04 NOTE — TELEPHONE ENCOUNTER
----- Message from Verona Kimball MD sent at 11/4/2020 12:39 PM CST -----  Please push him to go and get his labs . They were supposed to be done in June

## 2020-11-06 RX ORDER — ERGOCALCIFEROL 1.25 MG/1
50000 CAPSULE ORAL
Qty: 12 CAPSULE | Refills: 2 | Status: SHIPPED | OUTPATIENT
Start: 2020-11-06 | End: 2021-07-16

## 2020-11-07 LAB
25(OH)D3 SERPL-MCNC: 64 NG/ML (ref 30–100)
ALBUMIN SERPL-MCNC: 3.8 G/DL (ref 3.6–5.1)
ALBUMIN/GLOB SERPL: 1.6 (CALC) (ref 1–2.5)
ALP SERPL-CCNC: 48 U/L (ref 35–144)
ALT SERPL-CCNC: 18 U/L (ref 9–46)
AST SERPL-CCNC: 22 U/L (ref 10–35)
BASOPHILS # BLD AUTO: 28 CELLS/UL (ref 0–200)
BASOPHILS NFR BLD AUTO: 0.6 %
BILIRUB SERPL-MCNC: 0.6 MG/DL (ref 0.2–1.2)
BUN SERPL-MCNC: 22 MG/DL (ref 7–25)
BUN/CREAT SERPL: ABNORMAL (CALC) (ref 6–22)
CALCIUM SERPL-MCNC: 9.7 MG/DL (ref 8.6–10.3)
CHLORIDE SERPL-SCNC: 104 MMOL/L (ref 98–110)
CHOLEST SERPL-MCNC: 190 MG/DL
CHOLEST/HDLC SERPL: 4.8 (CALC)
CO2 SERPL-SCNC: 26 MMOL/L (ref 20–32)
CREAT SERPL-MCNC: 0.96 MG/DL (ref 0.7–1.25)
EOSINOPHIL # BLD AUTO: 61 CELLS/UL (ref 15–500)
EOSINOPHIL NFR BLD AUTO: 1.3 %
ERYTHROCYTE [DISTWIDTH] IN BLOOD BY AUTOMATED COUNT: 13.8 % (ref 11–15)
GFRSERPLBLD MDRD-ARVRAT: 81 ML/MIN/1.73M2
GLOBULIN SER CALC-MCNC: 2.4 G/DL (CALC) (ref 1.9–3.7)
GLUCOSE SERPL-MCNC: 102 MG/DL (ref 65–99)
HCT VFR BLD AUTO: 46.5 % (ref 38.5–50)
HDLC SERPL-MCNC: 40 MG/DL
HGB BLD-MCNC: 16.1 G/DL (ref 13.2–17.1)
LDLC SERPL CALC-MCNC: 129 MG/DL (CALC)
LYMPHOCYTES # BLD AUTO: 2416 CELLS/UL (ref 850–3900)
LYMPHOCYTES NFR BLD AUTO: 51.4 %
MCH RBC QN AUTO: 30.7 PG (ref 27–33)
MCHC RBC AUTO-ENTMCNC: 34.6 G/DL (ref 32–36)
MCV RBC AUTO: 88.6 FL (ref 80–100)
MONOCYTES # BLD AUTO: 672 CELLS/UL (ref 200–950)
MONOCYTES NFR BLD AUTO: 14.3 %
NEUTROPHILS # BLD AUTO: 1523 CELLS/UL (ref 1500–7800)
NEUTROPHILS NFR BLD AUTO: 32.4 %
NONHDLC SERPL-MCNC: 150 MG/DL (CALC)
PLATELET # BLD AUTO: 184 THOUSAND/UL (ref 140–400)
PMV BLD REES-ECKER: 9.6 FL (ref 7.5–12.5)
POTASSIUM SERPL-SCNC: 4.2 MMOL/L (ref 3.5–5.3)
PROT SERPL-MCNC: 6.2 G/DL (ref 6.1–8.1)
RBC # BLD AUTO: 5.25 MILLION/UL (ref 4.2–5.8)
SODIUM SERPL-SCNC: 139 MMOL/L (ref 135–146)
TRIGL SERPL-MCNC: 104 MG/DL
WBC # BLD AUTO: 4.7 THOUSAND/UL (ref 3.8–10.8)

## 2020-11-10 ENCOUNTER — TELEPHONE (OUTPATIENT)
Dept: INFECTIOUS DISEASES | Facility: CLINIC | Age: 65
End: 2020-11-10

## 2020-11-10 NOTE — TELEPHONE ENCOUNTER
He was tested at an urgent care in [facility], yesterday afternoon and then his boss made him get retested at [facility] both of which were positive.   Last week, about 5 days ago, he had cold symptoms and fatigued and left early from work to go home. Mild nausea, temp max 100.7, generally 99.6. he has quaratined since yesterday.   Advised he is home for 10days., today is day 2. He was given doxycycline at urgent care, tessalon and loratidine and nasal spray Ipatroprium.   Minimal cough. No chest tightness, but senses that he has some mucous inside. Minimal sputum, yellowish. Has not lost taste and smell.   Advised to walk and drink plenty fluids, take an aspirin a day, and to sleep prone and on his side.   Advised to get a pulse oximeter(he has one ), advised to monitor regularly and seek medical attention if he drops below 95% or becomes short of breath.   He will keep in touch.

## 2020-11-10 NOTE — TELEPHONE ENCOUNTER
Says he tested positive for COVID-19.  Says he would really like to speak to you to ensure he's doing the right thing and just some insight from you.  Thanks    ODESSA Maguire

## 2020-11-13 ENCOUNTER — TELEPHONE (OUTPATIENT)
Dept: INFECTIOUS DISEASES | Facility: CLINIC | Age: 65
End: 2020-11-13

## 2020-11-13 LAB
APPEARANCE UR: CLEAR
BILIRUB UR QL STRIP: NEGATIVE
BNP SERPL-MCNC: 31 PG/ML
COLOR UR: ABNORMAL
GLUCOSE UR QL STRIP: NEGATIVE
HGB UR QL STRIP: NEGATIVE
HIV1 RNA # SERPL NAA+PROBE: NORMAL COPIES/ML
HIV1 RNA SERPL NAA+PROBE-LOG#: NORMAL LOG COPIES/ML
KETONES UR QL STRIP: ABNORMAL
LEUKOCYTE ESTERASE UR QL STRIP: NEGATIVE
NITRITE UR QL STRIP: NEGATIVE
PH UR STRIP: ABNORMAL [PH] (ref 5–8)
PROT UR QL STRIP: ABNORMAL
RPR SER QL: NORMAL
SP GR UR STRIP: 1.04 (ref 1–1.03)

## 2020-11-13 NOTE — TELEPHONE ENCOUNTER
----- Message from Verona Kimball MD sent at 11/13/2020 10:21 AM CST -----  Please let him know he is undetectable

## 2020-11-30 DIAGNOSIS — R12 HEARTBURN: ICD-10-CM

## 2020-11-30 RX ORDER — HYDROGEN PEROXIDE 3 %
20 SOLUTION, NON-ORAL MISCELLANEOUS
Qty: 90 CAPSULE | Refills: 1 | Status: SHIPPED | OUTPATIENT
Start: 2020-11-30 | End: 2022-04-22 | Stop reason: ALTCHOICE

## 2020-12-17 ENCOUNTER — OFFICE VISIT (OUTPATIENT)
Dept: INFECTIOUS DISEASES | Facility: CLINIC | Age: 65
End: 2020-12-17
Payer: MEDICARE

## 2020-12-17 VITALS
HEIGHT: 67 IN | WEIGHT: 193.19 LBS | OXYGEN SATURATION: 97 % | HEART RATE: 81 BPM | DIASTOLIC BLOOD PRESSURE: 81 MMHG | BODY MASS INDEX: 30.32 KG/M2 | TEMPERATURE: 98 F | SYSTOLIC BLOOD PRESSURE: 127 MMHG

## 2020-12-17 DIAGNOSIS — R60.0 LOCALIZED EDEMA: ICD-10-CM

## 2020-12-17 DIAGNOSIS — G47.33 OSA (OBSTRUCTIVE SLEEP APNEA): ICD-10-CM

## 2020-12-17 DIAGNOSIS — M85.80 OSTEOPENIA, UNSPECIFIED LOCATION: ICD-10-CM

## 2020-12-17 DIAGNOSIS — B20 HIV INFECTION, UNSPECIFIED SYMPTOM STATUS: Primary | ICD-10-CM

## 2020-12-17 DIAGNOSIS — E88.1 LIPODYSTROPHY ASSOCIATED WITH HUMAN IMMUNODEFICIENCY VIRUS INFECTION: ICD-10-CM

## 2020-12-17 DIAGNOSIS — Q85.00 NEUROFIBROMATOSIS: ICD-10-CM

## 2020-12-17 DIAGNOSIS — Z79.899 ENCOUNTER FOR LONG-TERM (CURRENT) USE OF MEDICATIONS: ICD-10-CM

## 2020-12-17 DIAGNOSIS — Z56.6 STRESS AT WORK: ICD-10-CM

## 2020-12-17 DIAGNOSIS — B20 LIPODYSTROPHY ASSOCIATED WITH HUMAN IMMUNODEFICIENCY VIRUS INFECTION: ICD-10-CM

## 2020-12-17 PROCEDURE — 99214 OFFICE O/P EST MOD 30 MIN: CPT | Mod: S$GLB,,, | Performed by: INTERNAL MEDICINE

## 2020-12-17 PROCEDURE — 99214 PR OFFICE/OUTPT VISIT, EST, LEVL IV, 30-39 MIN: ICD-10-PCS | Mod: S$GLB,,, | Performed by: INTERNAL MEDICINE

## 2020-12-17 RX ORDER — BUSPIRONE HYDROCHLORIDE 5 MG/1
5 TABLET ORAL 2 TIMES DAILY PRN
Qty: 60 TABLET | Refills: 5 | Status: SHIPPED | OUTPATIENT
Start: 2020-12-17 | End: 2022-07-11 | Stop reason: SDUPTHER

## 2020-12-17 RX ORDER — CELECOXIB 200 MG/1
CAPSULE ORAL
COMMUNITY
End: 2021-06-17

## 2020-12-17 SDOH — SOCIAL DETERMINANTS OF HEALTH (SDOH): OTHER PHYSICAL AND MENTAL STRAIN RELATED TO WORK: Z56.6

## 2020-12-17 NOTE — PROGRESS NOTES
Subjective:       Patient ID: Dmitry Epps is a 65 y.o. male.    Chief Complaint:: HIV Positive     Since last visit. He did not see Dr. Trevino nor have his MRI , missed his appointment  Did not have lab as requested  Colonoscopy isn't until 2020  Has not reduced alcohol yet  Had yearly physical with Dr. De Paz in September and did not have his lab orders either. Prostate LOU was normal. Had a stomach bug that day.     7/12/19: just had a Squamous cell cancer removed from his forehead.   He saw Dr. Trevino and no additional MRI was needed, next visit in 1/2020, the previous MRI was stable.   Bone density still has osteopenia, and he is compliant with vitamin D and calcium  He has cut back on beer a little , but states he needs to cut back more. He is not exercising though his job is an active one.  He does not feel rested, but hasn't worn CPAP in years. He is open toseeing the sleep doctor again and trying a new device, etc.   He still feels bloated when eating, and acknowledges that he eats quickly.     1/17/20: cutting back on beer even more. He did join an exercise club that started last night, with a goal of losing 40-45#, BMI 29. Did not make an appointment with sleep doctor, but he plans to. He     Had a syncopal episode about 3 months ago. His prior cardiologist retired and has seen Dr. Turner. He cannot tell me that he told the cardiologist and his notes do not reflect this but subsequently remembers telling the cardiologist that he had lightheadedness.. Had a fracture of the left wrist and had bumps on his head. He went to an urgent care.  He was referred to orthopedist, Dr. Montanez.  He had difficulty getting adequate pain relief.  The cardiologist did an echo and a stress test which were ok, at [facility]. He has episodes of lightheadedness and pre-syncope and mostly when he is getting up from bed in the morning. There are times when he describes vertigo too with associated changes in position or changes of  his head or movements of his eye. Changing from atripla to biktarvy has not been associated with a change (might have expected this on atripla but not Biktarvy). He has not noted a pattern, ie after an evening of beer and not drinking enough fluids but he may keep a diary of this.    Reviewed 11/2019 lab and vitamin D low, viral load undetectable. Lipids were better, PSA was fine. Switched to biktarvy in September. Did receive a flu vaccine and a MMR booster.     6/18/20: he has continued to work through the COVID pandemic, but he is protecting himself in public places, except neighborhood [employer]. His gym closed and he gained back the weight that he had lost. During the work outs he fractured 7-9 ribs (no fall)his BMD in 12/2018 was roughly the same as the test in 2015 12/17/20: since last visit he had COVID on 11/9/20. He felt poorly for 3 days before then and for 2 weeks after. He has had negative COVID 12/7, and IgG positive 12/9.   He had a virtual visit with his cardiologist. STress test was ok and echo showed mild pulmonary hypertension, 36 mmHg. He did wear CPAP while he had COVID, but now the mask is very irritating. He can wear it sometimes up to 6 hours. He did have flu vax.  He has been taking the Remeron at night and alprazolam 0.5 in the morning, almost every morning that he goes to work because of the tremendous anxiety and stress that he faces when he goes to work.  We discussed in detail that this is not a fruitful strategy for handling daily stress as alprazolam is habit forming.  I was under the impression it was for bedtime.  We discussed at length:  His discussing with his superior ways to reduce his stress.  He finds that when he had elevates mistakes are made and that he has twice as much work to do.  We discussed counseling to address coping mechanism and alternative medications such as an SSRI or buspirone which are not habit forming.  He is willing.  He has decreased the amount of beer that  he drinks at night and the frequency with which he visits the local bar.        Current Outpatient Medications:     ALPRAZolam (XANAX) 0.5 MG tablet, TK 1 T PO  QHS PRF NEEDED FOR SLEEP, Disp: 30 tablet, Rfl: 5    BIKTARVY -25 mg per tablet, , Disp: , Rfl:     calcium-vitamin D3 (CALCIUM 500 + D) 500 mg(1,250mg) -200 unit per tablet, Take 1 tablet by mouth 2 (two) times daily with meals., Disp: , Rfl:     celecoxib (CELEBREX) 200 MG capsule, celecoxib 200 mg capsule  TAKE ONE CAPSULE BY MOUTH TWICE DAILY, Disp: , Rfl:     ergocalciferol (ERGOCALCIFEROL) 50,000 unit Cap, ergocalciferol (vitamin D2) 50,000 unit capsule, Disp: , Rfl:     ergocalciferol (VITAMIN D2) 50,000 unit Cap, Take 1 capsule (50,000 Units total) by mouth every 7 days., Disp: 12 capsule, Rfl: 2    esomeprazole (NEXIUM) 20 MG capsule, Take 1 capsule (20 mg total) by mouth before breakfast., Disp: 90 capsule, Rfl: 1    HYDROcodone-acetaminophen (NORCO)  mg per tablet, hydrocodone 10 mg-acetaminophen 325 mg tablet, Disp: , Rfl:     mirtazapine (REMERON) 45 MG tablet, Take 45 mg by mouth nightly., Disp: , Rfl:     pravastatin (PRAVACHOL) 40 MG tablet, Take 40 mg by mouth once daily., Disp: , Rfl:     risedronate (ACTONEL) 35 MG tablet, Take one tablet weekly on an empty stomach, with a large glass of water, and do not recline for 30 minutes., Disp: 12 tablet, Rfl: 3    busPIRone (BUSPAR) 5 MG Tab, Take 1 tablet (5 mg total) by mouth 2 (two) times daily as needed (anxiety)., Disp: 60 tablet, Rfl: 5    FLUZONE HIGH-DOSE 2018-19, PF, 180 mcg/0.5 mL vaccine, ADM 0.5ML IM UTD, Disp: , Rfl: 0    FLUZONE HIGH-DOSE 2019-20, PF, 180 mcg/0.5 mL Syrg, ADM 0.5ML IM UTD, Disp: , Rfl: 0  Review of patient's allergies indicates:   Allergen Reactions    Sulfa (sulfonamide antibiotics) Rash     Past Medical History:   Diagnosis Date    Allergic sinusitis     Chronic    Snell's esophagus     on EGD    Colon polyps     Detached vitreous  humor, bilateral     GERD (gastroesophageal reflux disease)     HH    Giardia 2014    HIV infection 11/1992    H/o HPV and KS (prior meds:indiavir, com/nfv, then atripla 06/2013    Hyperlipidemia     Mild pulmonary hypertension 03/2010    Found on Echo    Neurofibromatosis     Olecranon bursitis, left elbow 10/2015    CINDY (obstructive sleep apnea)     Not using CPAP    Restless leg syndrome     Rib fractures 2020    Squamous cell carcinoma 07/2019    forehead    Abdul-Leeroy syndrome     cause unclear    Tinnitus     Vitamin D deficiency         Past Surgical History:   Procedure Laterality Date    Adenomatous Polyp  04/2007    ([facility])    COLONOSCOPY  03/2004    FOOT NEUROMA SURGERY      PROSTATE BIOPSY      Negative    Repair of Deviated Septum/Uvuloplasty  02/2011    [facility]    TONSILLECTOMY      UVULECTOMY  2011         , colonoscopy with adenomatous polyp    Social History     Socioeconomic History    Marital status: Single     Spouse name: Not on file    Number of children: Not on file    Years of education: Not on file    Highest education level: Not on file   Occupational History    Not on file   Social Needs    Financial resource strain: Not on file    Food insecurity     Worry: Not on file     Inability: Not on file    Transportation needs     Medical: Not on file     Non-medical: Not on file   Tobacco Use    Smoking status: Never Smoker    Smokeless tobacco: Never Used   Substance and Sexual Activity    Alcohol use: Yes     Comment: 3-5 times a week    Drug use: No    Sexual activity: Not on file   Lifestyle    Physical activity     Days per week: Not on file     Minutes per session: Not on file    Stress: Not on file   Relationships    Social connections     Talks on phone: Not on file     Gets together: Not on file     Attends [employer] service: Not on file     Active member of club or organization: Not on file     Attends meetings of clubs or organizations: Not  on file     Relationship status: Not on file   Other Topics Concern    Not on file   Social History Narrative    Not on file     Family History   Problem Relation Age of Onset    Hypertension Mother     Hypertension Father     Neurofibromatosis Sister         Younger sister, different sister with glioblastoma    Diabetes Paternal Grandmother     Prostate cancer Paternal Uncle     Multiple family members with neurofibromatosis    Travel History:   Vaccine History: Yearly flu vaccine, Menactra 2011, typhoid 2011, Pneumovax 2007,2018 , Prevnar 2014, tdap 2016, hepatitis A 1 into 2005, Zostavax 2014, hepatitis B series nonresponder, repeated 2016 nonresponder, shingrix #1 and 2 2018, MMR 11/2019  Advanced Directive:   Safer Sex: Abstinent  Bone Density: Osteopenia 2011, 2015, 12/2018IMPRESSION:   The T-score of the L1-L4 vertebral bodies is -1.0. The T-score of the left  femoral neck is calculated to be   -1.4.   The T-score of the right femoral neck is calculated to be -1.0. The patient is still considered osteopenic according to World Health Organization guidelines and insufficiency fracture risk remains moderate.  Colonoscopy: 2004, 2007, 2015      :Review of Systems    Constitutional: No fever, chills, sweats, fatigue, weakness, weight loss. Weight gain. No exercise.  He is exhausted on the weekends from the work during the week and stress.?  From drinking on Friday and Saturday night and or contribution of not wearing CPAP.    Eyes: No change in vision, loss of vision, diplopia, photophobia. UTD eye exam    ENT: No sinus drainage, sore throat, mouth pain, or lesions. UTD dental exam    Cardiovascular: No chest pain, occasional  KEENAN, palpitations  And has been having occasional LE edema. H  No orthopnea. Normal EF 12/2019.      Respiratory: No shortness of breath,    Cough, chest congestion. Occasional dyspnea when exerting, not surprising  See HPI for CINDY.    Gastrointestinal: No abdominal pain, nausea,  "vomiting, diarrhea, constipation, due for colonoscopy. . Aching mid, per umbilical occasionally.  No blood in stool, gas, affect from meals or lack of meals.    Genitourinary: No dysuria, hematuria, incontinence, frequency, flank pain,     Musculoskeletal: No new pain, joint swelling, and fracture of left wrist, which is now healed but still bothers him when he exercises and is tender. Wants more PT to address this  Ribs ok    Integumentary:  2 tiny spots on his left lower neck.  Has seen derm and does so fairly regularly    Vascular: no Claudication or major varicosities    Neurological: No unusual headaches, neuropathy, or falls. occasional  vertigo. He saw his neurologist and had last MRI was last year.   Psychiatric:  See HPI,  And drinking TIW and therefore drinking less. Max 3 beers and will try to reduce more.    Endocrine: NO diabetes, Thyroid normal    Lymphatic: No lymphadenopathy, blood loss, anemia, or malignancy    Objective:      Blood pressure 127/81, pulse 81, temperature 97.9 °F (36.6 °C), temperature source Temporal, height 5' 7" (1.702 m), weight 87.6 kg (193 lb 3.2 oz), SpO2 97 %. Body mass index is 30.26 kg/m².  Physical Exam      General: Alert and attentive, cooperative and in no distress. Moderate lipoatrophy of face and limbs but increasing girth    Eyes: Pupils equal, round, reactive to light, anicteric, EOMI  .    Neck: Supple, non-tender, no thyromegaly or masses. No JVD    ENT: EAC patent, TM normal, nares patent, no oral lesions, teeth in good condition, no thrush. Wearing hearing aids now    Cardiovascular: Regular rate and rhythm, no murmurs, rubs, or gallop    Respiratory: Lungs clear without wheezes, no rales, rub or rhonci.   Gastrointestinal: Active bowel sounds, soft, no mass or organomegaly, no distention.protuberant    Genitourinary: No  flank tenderness    Vascular: mild LE edema,no  phlebitis, pulses normal. Warm and well perfused, no calf or popliteal tenderness, no cords. " Mild varicosities    Musculoskeletal: Ambulates without difficulty, no acute arthritis, synovitis or myositis. Normal muscle bulk and strength    Integumentary: Skin without rashes  Does have a number of neurofibromas.   To tiny skin tags left lower neck and 1 flat brown irregular macule right lower neck which appears benign    AnusRectum: per Dr. De Paz 2019, awaiting new PCP.  He will share names with me for my input    Neurological: Normal LOC, cranial nerves, speech, reflexes, normal gait. .    Psychiatric: Normal mood, speech, demeanor    Lymphatic: No cervical, supraclavicular, axillary, or inguinal lymphadenopathy      Wound:     HIV Table:   HLA-B 5701     TOXOIgG  neg   RPR 11/2020 non reactive   HEP A IgG  vaccinated 2005, 2012   HBsAg 8/14/2015 neg, vacc 1980's, 2015 x3   HBsAb 6/19/17 neg, nonresponder after 2 series    HBcAb 8/18/2015 negative   HBeAb     HBeAg     HCVAb 12/2018 negative   HBV DNA     HCV RNA     Vitamin D 11/2020 normal 64   Chlamydia / GC 4/3/2015 negative   TB Gold  11/2019     negative  PSA   11/8/19   3.0      Results for ALVARADO CORDOVA (MRN [id number]) as of 6/18/2020 22:02   Ref. Range 11/8/2019 07:12   Rubeola IgG Latest Units: AU/mL <25.00 (L)     Recent Diagnostics: reviewed  12/31/19 TTE   1. Normal left ventricular systolic function.   2. Mildly elevated systolic pulmonary artery pressure.  37   3. Right ventricular systolic function appears normal.    12/31/19 ETT  1. Normal control electrocardiogram.  2. Treadmill stress ECG is negative for ischemia.  3. Functional capacity is good for age.    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  Dispense: 60 tablet; Refill: 5      .  Still needs to schedule [facility] density test [facility]     I   Return in 6 months   Discuss remedies with supervisor to reduce work stress  Reduce alprazolam 0.25mg three times a day and we can try buspar(buspirone) in its place. Do not take them together  Consider a counselor to work on stress coping strategies    Try to wear your CPAP as much as possible. Pursue different masks if you have trouble tolerating.     Past Time for colonoscopy, I recommend Dr. Neela Zuleta    After your prescription vitamin D runs out(no need to refill), take 2000 IU per day, along with calcium. DO NOT take calcium within 4 hours of Biktarvy    Next lab and visit in May

## 2020-12-17 NOTE — PATIENT INSTRUCTIONS
Discuss remedies with supervisor to reduce work stress  Reduce alprazolam 0.25mg three times a day and we can try buspar(buspirone) in its place. Do not take them together  Consider a counselor to work on stress coping strategies    Try to wear your CPAP as much as possible. Pursue different masks if you have trouble tolerating.     Past Time for colonoscopy, I recommend Dr. Neela Zuleta    After your prescription vitamin D runs out(no need to refill), take 2000 IU per day, along with calcium. DO NOT take calcium within 4 hours of Biktarvy    Next lab and visit in May

## 2020-12-29 DIAGNOSIS — G47.00 INSOMNIA, UNSPECIFIED TYPE: ICD-10-CM

## 2020-12-29 RX ORDER — ALPRAZOLAM 0.25 MG/1
TABLET ORAL
Qty: 30 TABLET | Refills: 1 | Status: SHIPPED | OUTPATIENT
Start: 2020-12-29 | End: 2023-06-19

## 2021-04-26 ENCOUNTER — PATIENT MESSAGE (OUTPATIENT)
Dept: RESEARCH | Facility: HOSPITAL | Age: 66
End: 2021-04-26

## 2021-04-27 DIAGNOSIS — E78.5 HYPERLIPIDEMIA, UNSPECIFIED HYPERLIPIDEMIA TYPE: Primary | ICD-10-CM

## 2021-04-27 RX ORDER — PRAVASTATIN SODIUM 40 MG/1
40 TABLET ORAL DAILY
Qty: 30 TABLET | Refills: 6 | Status: SHIPPED | OUTPATIENT
Start: 2021-04-27 | End: 2022-02-21 | Stop reason: SDUPTHER

## 2021-06-14 DIAGNOSIS — B20 HIV INFECTION, UNSPECIFIED SYMPTOM STATUS: Primary | ICD-10-CM

## 2021-06-14 RX ORDER — BICTEGRAVIR SODIUM, EMTRICITABINE, AND TENOFOVIR ALAFENAMIDE FUMARATE 50; 200; 25 MG/1; MG/1; MG/1
1 TABLET ORAL DAILY
Qty: 90 TABLET | Refills: 2 | Status: SHIPPED | OUTPATIENT
Start: 2021-06-14 | End: 2022-03-11

## 2021-06-17 ENCOUNTER — OFFICE VISIT (OUTPATIENT)
Dept: INFECTIOUS DISEASES | Facility: CLINIC | Age: 66
End: 2021-06-17
Payer: MEDICARE

## 2021-06-17 VITALS
OXYGEN SATURATION: 97 % | DIASTOLIC BLOOD PRESSURE: 70 MMHG | TEMPERATURE: 98 F | WEIGHT: 186 LBS | SYSTOLIC BLOOD PRESSURE: 128 MMHG | BODY MASS INDEX: 29.19 KG/M2 | HEIGHT: 67 IN | HEART RATE: 72 BPM

## 2021-06-17 DIAGNOSIS — Z79.899 ENCOUNTER FOR LONG-TERM (CURRENT) USE OF MEDICATIONS: ICD-10-CM

## 2021-06-17 DIAGNOSIS — B20 HIV INFECTION, UNSPECIFIED SYMPTOM STATUS: Primary | ICD-10-CM

## 2021-06-17 DIAGNOSIS — G47.33 OSA (OBSTRUCTIVE SLEEP APNEA): ICD-10-CM

## 2021-06-17 DIAGNOSIS — B20 LIPODYSTROPHY ASSOCIATED WITH HUMAN IMMUNODEFICIENCY VIRUS INFECTION: ICD-10-CM

## 2021-06-17 DIAGNOSIS — E78.5 HYPERLIPIDEMIA, UNSPECIFIED HYPERLIPIDEMIA TYPE: ICD-10-CM

## 2021-06-17 DIAGNOSIS — Z12.5 SCREENING FOR PROSTATE CANCER: ICD-10-CM

## 2021-06-17 DIAGNOSIS — M85.80 OSTEOPENIA, UNSPECIFIED LOCATION: ICD-10-CM

## 2021-06-17 DIAGNOSIS — E88.1 LIPODYSTROPHY ASSOCIATED WITH HUMAN IMMUNODEFICIENCY VIRUS INFECTION: ICD-10-CM

## 2021-06-17 PROCEDURE — 99214 OFFICE O/P EST MOD 30 MIN: CPT | Mod: S$GLB,,, | Performed by: INTERNAL MEDICINE

## 2021-06-17 PROCEDURE — 99214 PR OFFICE/OUTPT VISIT, EST, LEVL IV, 30-39 MIN: ICD-10-PCS | Mod: S$GLB,,, | Performed by: INTERNAL MEDICINE

## 2021-06-17 RX ORDER — NAPROXEN 500 MG/1
TABLET ORAL
COMMUNITY
End: 2023-06-19

## 2021-08-11 LAB
25(OH)D3 SERPL-MCNC: 42 NG/ML (ref 30–100)
ALBUMIN SERPL-MCNC: 4 G/DL (ref 3.6–5.1)
ALBUMIN/GLOB SERPL: 1.5 (CALC) (ref 1–2.5)
ALP SERPL-CCNC: 48 U/L (ref 35–144)
ALT SERPL-CCNC: 18 U/L (ref 9–46)
AST SERPL-CCNC: 17 U/L (ref 10–35)
BASOPHILS # BLD AUTO: 50 CELLS/UL (ref 0–200)
BASOPHILS NFR BLD AUTO: 0.7 %
BILIRUB SERPL-MCNC: 0.6 MG/DL (ref 0.2–1.2)
BUN SERPL-MCNC: 18 MG/DL (ref 7–25)
BUN/CREAT SERPL: NORMAL (CALC) (ref 6–22)
CALCIUM SERPL-MCNC: 9.2 MG/DL (ref 8.6–10.3)
CHLORIDE SERPL-SCNC: 106 MMOL/L (ref 98–110)
CHOLEST SERPL-MCNC: 160 MG/DL
CHOLEST/HDLC SERPL: 3.9 (CALC)
CO2 SERPL-SCNC: 25 MMOL/L (ref 20–32)
CREAT SERPL-MCNC: 0.94 MG/DL (ref 0.7–1.25)
EOSINOPHIL # BLD AUTO: 101 CELLS/UL (ref 15–500)
EOSINOPHIL NFR BLD AUTO: 1.4 %
ERYTHROCYTE [DISTWIDTH] IN BLOOD BY AUTOMATED COUNT: 13.1 % (ref 11–15)
GAMMA INTERFERON BACKGROUND BLD IA-ACNC: 0.04 IU/ML
GLOBULIN SER CALC-MCNC: 2.6 G/DL (CALC) (ref 1.9–3.7)
GLUCOSE SERPL-MCNC: 89 MG/DL (ref 65–99)
HCT VFR BLD AUTO: 47.8 % (ref 38.5–50)
HDLC SERPL-MCNC: 41 MG/DL
HGB BLD-MCNC: 15.8 G/DL (ref 13.2–17.1)
HIV1 RNA # SERPL NAA+PROBE: NORMAL COPIES/ML
HIV1 RNA SERPL NAA+PROBE-LOG#: NORMAL LOG COPIES/ML
LDLC SERPL CALC-MCNC: 98 MG/DL (CALC)
LYMPHOCYTES # BLD AUTO: 2095 CELLS/UL (ref 850–3900)
LYMPHOCYTES NFR BLD AUTO: 29.1 %
M TB IFN-G BLD-IMP: NEGATIVE
M TB IFN-G CD4+ BCKGRND COR BLD-ACNC: 0.01 IU/ML
MCH RBC QN AUTO: 29.9 PG (ref 27–33)
MCHC RBC AUTO-ENTMCNC: 33.1 G/DL (ref 32–36)
MCV RBC AUTO: 90.5 FL (ref 80–100)
MITOGEN IGNF BCKGRD COR BLD-ACNC: 8.02 IU/ML
MONOCYTES # BLD AUTO: 770 CELLS/UL (ref 200–950)
MONOCYTES NFR BLD AUTO: 10.7 %
NEUTROPHILS # BLD AUTO: 4183 CELLS/UL (ref 1500–7800)
NEUTROPHILS NFR BLD AUTO: 58.1 %
NONHDLC SERPL-MCNC: 119 MG/DL (CALC)
PLATELET # BLD AUTO: 223 THOUSAND/UL (ref 140–400)
PMV BLD REES-ECKER: 9.3 FL (ref 7.5–12.5)
POTASSIUM SERPL-SCNC: 4.4 MMOL/L (ref 3.5–5.3)
PROT SERPL-MCNC: 6.6 G/DL (ref 6.1–8.1)
PSA SERPL-MCNC: 3.7 NG/ML
RBC # BLD AUTO: 5.28 MILLION/UL (ref 4.2–5.8)
SODIUM SERPL-SCNC: 138 MMOL/L (ref 135–146)
TB2 - NIL: 0.01 IU/ML
TRIGL SERPL-MCNC: 113 MG/DL
WBC # BLD AUTO: 7.2 THOUSAND/UL (ref 3.8–10.8)

## 2021-08-23 ENCOUNTER — TELEPHONE (OUTPATIENT)
Dept: INFECTIOUS DISEASES | Facility: CLINIC | Age: 66
End: 2021-08-23

## 2021-12-14 ENCOUNTER — OFFICE VISIT (OUTPATIENT)
Dept: INFECTIOUS DISEASES | Facility: CLINIC | Age: 66
End: 2021-12-14
Payer: MEDICARE

## 2021-12-14 ENCOUNTER — PATIENT MESSAGE (OUTPATIENT)
Dept: INFECTIOUS DISEASES | Facility: CLINIC | Age: 66
End: 2021-12-14

## 2021-12-14 ENCOUNTER — HOSPITAL ENCOUNTER (OUTPATIENT)
Dept: RADIOLOGY | Facility: HOSPITAL | Age: 66
Discharge: HOME | End: 2021-12-14
Attending: INTERNAL MEDICINE
Payer: MEDICARE

## 2021-12-14 VITALS
HEIGHT: 67 IN | OXYGEN SATURATION: 97 % | DIASTOLIC BLOOD PRESSURE: 77 MMHG | WEIGHT: 191.38 LBS | SYSTOLIC BLOOD PRESSURE: 125 MMHG | BODY MASS INDEX: 30.04 KG/M2 | TEMPERATURE: 98 F | HEART RATE: 75 BPM

## 2021-12-14 DIAGNOSIS — B20 HIV INFECTION, UNSPECIFIED SYMPTOM STATUS: Primary | ICD-10-CM

## 2021-12-14 DIAGNOSIS — B20 LIPODYSTROPHY ASSOCIATED WITH HUMAN IMMUNODEFICIENCY VIRUS INFECTION: ICD-10-CM

## 2021-12-14 DIAGNOSIS — R06.00 DYSPNEA, UNSPECIFIED TYPE: ICD-10-CM

## 2021-12-14 DIAGNOSIS — Z79.899 ENCOUNTER FOR LONG-TERM (CURRENT) USE OF MEDICATIONS: ICD-10-CM

## 2021-12-14 DIAGNOSIS — M85.80 OSTEOPENIA, UNSPECIFIED LOCATION: ICD-10-CM

## 2021-12-14 DIAGNOSIS — E78.5 HYPERLIPIDEMIA, UNSPECIFIED HYPERLIPIDEMIA TYPE: ICD-10-CM

## 2021-12-14 DIAGNOSIS — Q85.00 NEUROFIBROMATOSIS: ICD-10-CM

## 2021-12-14 DIAGNOSIS — M81.0 AGE-RELATED OSTEOPOROSIS WITHOUT CURRENT PATHOLOGICAL FRACTURE: ICD-10-CM

## 2021-12-14 DIAGNOSIS — E88.1 LIPODYSTROPHY ASSOCIATED WITH HUMAN IMMUNODEFICIENCY VIRUS INFECTION: ICD-10-CM

## 2021-12-14 DIAGNOSIS — Z71.89 EDUCATED ABOUT COVID-19 VIRUS INFECTION: ICD-10-CM

## 2021-12-14 DIAGNOSIS — Z56.6 STRESS AT WORK: ICD-10-CM

## 2021-12-14 PROCEDURE — 71046 X-RAY EXAM CHEST 2 VIEWS: CPT | Mod: TC

## 2021-12-14 PROCEDURE — 99214 OFFICE O/P EST MOD 30 MIN: CPT | Mod: S$GLB,,, | Performed by: INTERNAL MEDICINE

## 2021-12-14 PROCEDURE — 99214 PR OFFICE/OUTPT VISIT, EST, LEVL IV, 30-39 MIN: ICD-10-PCS | Mod: S$GLB,,, | Performed by: INTERNAL MEDICINE

## 2021-12-14 SDOH — SOCIAL DETERMINANTS OF HEALTH (SDOH): OTHER PHYSICAL AND MENTAL STRAIN RELATED TO WORK: Z56.6

## 2022-02-21 DIAGNOSIS — E78.5 HYPERLIPIDEMIA, UNSPECIFIED HYPERLIPIDEMIA TYPE: Primary | ICD-10-CM

## 2022-02-21 RX ORDER — PRAVASTATIN SODIUM 40 MG/1
40 TABLET ORAL DAILY
Qty: 30 TABLET | Refills: 6 | Status: SHIPPED | OUTPATIENT
Start: 2022-02-21 | End: 2022-08-09 | Stop reason: SDUPTHER

## 2022-04-18 DIAGNOSIS — B20 HIV INFECTION, UNSPECIFIED SYMPTOM STATUS: Primary | ICD-10-CM

## 2022-04-18 RX ORDER — BICTEGRAVIR SODIUM, EMTRICITABINE, AND TENOFOVIR ALAFENAMIDE FUMARATE 50; 200; 25 MG/1; MG/1; MG/1
1 TABLET ORAL DAILY
Qty: 90 TABLET | Refills: 1 | Status: SHIPPED | OUTPATIENT
Start: 2022-04-18 | End: 2022-10-03 | Stop reason: SDUPTHER

## 2022-04-18 RX ORDER — BICTEGRAVIR SODIUM, EMTRICITABINE, AND TENOFOVIR ALAFENAMIDE FUMARATE 50; 200; 25 MG/1; MG/1; MG/1
1 TABLET ORAL DAILY
COMMUNITY
Start: 2022-03-24 | End: 2022-04-18 | Stop reason: SDUPTHER

## 2022-04-22 RX ORDER — PANTOPRAZOLE SODIUM 40 MG/1
40 TABLET, DELAYED RELEASE ORAL DAILY
Qty: 90 TABLET | Refills: 3 | Status: SHIPPED | OUTPATIENT
Start: 2022-04-22 | End: 2022-08-17 | Stop reason: SDUPTHER

## 2022-06-23 ENCOUNTER — OFFICE VISIT (OUTPATIENT)
Dept: INFECTIOUS DISEASES | Facility: CLINIC | Age: 67
End: 2022-06-23
Payer: MEDICARE

## 2022-06-23 VITALS
SYSTOLIC BLOOD PRESSURE: 118 MMHG | BODY MASS INDEX: 29.9 KG/M2 | TEMPERATURE: 98 F | WEIGHT: 190.5 LBS | HEIGHT: 67 IN | OXYGEN SATURATION: 98 % | DIASTOLIC BLOOD PRESSURE: 64 MMHG | HEART RATE: 75 BPM

## 2022-06-23 DIAGNOSIS — E78.5 HYPERLIPIDEMIA, UNSPECIFIED HYPERLIPIDEMIA TYPE: ICD-10-CM

## 2022-06-23 DIAGNOSIS — B20 LIPODYSTROPHY ASSOCIATED WITH HUMAN IMMUNODEFICIENCY VIRUS INFECTION: ICD-10-CM

## 2022-06-23 DIAGNOSIS — M85.80 OSTEOPENIA, UNSPECIFIED LOCATION: ICD-10-CM

## 2022-06-23 DIAGNOSIS — M81.0 AGE-RELATED OSTEOPOROSIS WITHOUT CURRENT PATHOLOGICAL FRACTURE: ICD-10-CM

## 2022-06-23 DIAGNOSIS — Z71.89 EDUCATED ABOUT COVID-19 VIRUS INFECTION: ICD-10-CM

## 2022-06-23 DIAGNOSIS — E88.1 LIPODYSTROPHY ASSOCIATED WITH HUMAN IMMUNODEFICIENCY VIRUS INFECTION: ICD-10-CM

## 2022-06-23 DIAGNOSIS — N52.9 ERECTILE DYSFUNCTION, UNSPECIFIED ERECTILE DYSFUNCTION TYPE: ICD-10-CM

## 2022-06-23 DIAGNOSIS — B20 HIV INFECTION, UNSPECIFIED SYMPTOM STATUS: Primary | ICD-10-CM

## 2022-06-23 DIAGNOSIS — Z79.899 ENCOUNTER FOR LONG-TERM (CURRENT) USE OF MEDICATIONS: ICD-10-CM

## 2022-06-23 PROCEDURE — 99214 OFFICE O/P EST MOD 30 MIN: CPT | Mod: S$GLB,,, | Performed by: INTERNAL MEDICINE

## 2022-06-23 PROCEDURE — 99214 PR OFFICE/OUTPT VISIT, EST, LEVL IV, 30-39 MIN: ICD-10-PCS | Mod: S$GLB,,, | Performed by: INTERNAL MEDICINE

## 2022-06-23 RX ORDER — TADALAFIL 10 MG/1
10 TABLET ORAL
Qty: 20 TABLET | Refills: 1 | Status: SHIPPED | OUTPATIENT
Start: 2022-06-23 | End: 2023-12-14 | Stop reason: SDUPTHER

## 2022-06-23 NOTE — PROGRESS NOTES
Subjective:       Patient ID: Dmitry Epps is a 66 y.o. male.    Chief Complaint:: HIV Positive     Since last visit. He did not see Dr. Trevino nor have his MRI , missed his appointment  Did not have lab as requested  Colonoscopy isn't until 2020  Has not reduced alcohol yet  Had yearly physical with Dr. De Paz in September and did not have his lab orders either. Prostate LOU was normal. Had a stomach bug that day.     7/12/19: just had a Squamous cell cancer removed from his forehead.   He saw Dr. Trevino and no additional MRI was needed, next visit in 1/2020, the previous MRI was stable.   Bone density still has osteopenia, and he is compliant with vitamin D and calcium  He has cut back on beer a little , but states he needs to cut back more. He is not exercising though his job is an active one.  He does not feel rested, but hasn't worn CPAP in years. He is open toseeing the sleep doctor again and trying a new device, etc.   He still feels bloated when eating, and acknowledges that he eats quickly.     1/17/20: cutting back on beer even more. He did join an exercise club that started last night, with a goal of losing 40-45#, BMI 29. Did not make an appointment with sleep doctor, but he plans to. He     Had a syncopal episode about 3 months ago. His prior cardiologist retired and has seen Dr. Turner. He cannot tell me that he told the cardiologist and his notes do not reflect this but subsequently remembers telling the cardiologist that he had lightheadedness.. Had a fracture of the left wrist and had bumps on his head. He went to an urgent care.  He was referred to orthopedist, Dr. Montanez.  He had difficulty getting adequate pain relief.  The cardiologist did an echo and a stress test which were ok, at [facility]. He has episodes of lightheadedness and pre-syncope and mostly when he is getting up from bed in the morning. There are times when he describes vertigo too with associated changes in position or changes of  his head or movements of his eye. Changing from atripla to biktarvy has not been associated with a change (might have expected this on atripla but not Biktarvy). He has not noted a pattern, ie after an evening of beer and not drinking enough fluids but he may keep a diary of this.    Reviewed 11/2019 lab and vitamin D low, viral load undetectable. Lipids were better, PSA was fine. Switched to biktarvy in September. Did receive a flu vaccine and a MMR booster.     6/18/20: he has continued to work through the COVID pandemic, but he is protecting himself in public places, except neighborhood bar. His gym closed and he gained back the weight that he had lost. During the work outs he fractured 7-9 ribs (no fall)his BMD in 12/2018 was roughly the same as the test in 2015 12/17/20: since last visit he had COVID on 11/9/20. He felt poorly for 3 days before then and for 2 weeks after. He has had negative COVID 12/7, and IgG positive 12/9.   He had a virtual visit with his cardiologist. STress test was ok and echo showed mild pulmonary hypertension, 36 mmHg. He did wear CPAP while he had COVID, but now the mask is very irritating. He can wear it sometimes up to 6 hours. He did have flu vax.  He has been taking the Remeron at night and alprazolam 0.5 in the morning, almost every morning that he goes to work because of the tremendous anxiety and stress that he faces when he goes to work.  We discussed in detail that this is not a fruitful strategy for handling daily stress as alprazolam is habit forming.  I was under the impression it was for bedtime.  We discussed at length:  His discussing with his superior ways to reduce his stress.  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  We discussed counseling to address coping mechanism and alternative medications such as an SSRI or buspirone which are not habit forming.  He is willing.  He has decreased the amount of beer that  he drinks at night and the frequency with which he visits the local bar.    6/17/21: cutting back on remeron at bedtime to reduce daytime fatigue. He has untreated sleep apnea since he stopped using it after COVID. He spoke to his respiratory provider. He went to his father's home in [place] for 2 weeks to help him, which was exhausting. He is 88 years old. He has not yet resumed the CPAP but he may need new equipment or a repeat sleep test.  He states he will work on that this week  He is having achilles pain, working on this with podiatry, Josef Newton DPM, and Dr. Jones  He made an appt with Dr. Ho for colonoscopy 6/22. Sees Dr. Burris 8/11/21 in place of Dr. brewster'  Taking OTC Calcium plus D, but taking with Biktarvy    12/14/21: received COVID booster little more than 2 weeks ago.  Pain in right hand, neuralgia like, going to see Dr. Garcia. Also left shoulder pain, posteriorly for over a year or more. Does not remember an injury.   When he bends over, he feels winded. He was supposed to get CXR per PCP but has not had it. Discussed that this is mechanical in all likelihood.  He is compliant with his HIV medicine he is not compliant with CPAP.  He is getting little exercise, he is still drinking a moderate amount of alcohol.    6/23/22: discussed vitamins, COVID vaccine, monkeypox, shingles and vaccines  Spot on chest, seeing dermatologist. One occasionally itches.  It is the same as last roma   Left shoulder pain, seen by orthopedics. Has not had a bone density yet (last was 2018), given an order last visit. Had a fall, head trauma, may have injured nasal septum. Seen in ED and had CT of head and cervical spine 3/21, had to have stitches in lip. PFTs in march were normal(Farhan)    Current Outpatient Medications:     BIKTARVY -25 mg (25 kg or greater), Take 1 tablet by mouth once daily., Disp: 90 tablet, Rfl: 1    calcium-vitamin D3 (OS-JOSÉ MIGUEL 500 + D3) 500 mg-5 mcg (200 unit) per tablet, Take 1  tablet by mouth 2 (two) times daily with meals., Disp: , Rfl:     mirtazapine (REMERON SOL-TAB) 30 MG disintegrating tablet, Take 15 mg by mouth nightly. , Disp: , Rfl:     pantoprazole (PROTONIX) 40 MG tablet, Take 1 tablet (40 mg total) by mouth once daily., Disp: 90 tablet, Rfl: 3    pravastatin (PRAVACHOL) 40 MG tablet, Take 1 tablet (40 mg total) by mouth once daily., Disp: 30 tablet, Rfl: 6    ALPRAZolam (XANAX) 0.25 MG tablet, TK 1 T PO  QHS PRF NEEDED FOR SLEEP (Patient not taking: No sig reported), Disp: 30 tablet, Rfl: 1    busPIRone (BUSPAR) 5 MG Tab, Take 1 tablet (5 mg total) by mouth 2 (two) times daily as needed (anxiety)., Disp: 60 tablet, Rfl: 5    ergocalciferol (ERGOCALCIFEROL) 50,000 unit Cap, ergocalciferol (vitamin D2) 50,000 unit capsule, Disp: , Rfl:     HYDROcodone-acetaminophen (NORCO)  mg per tablet, hydrocodone 10 mg-acetaminophen 325 mg tablet, Disp: , Rfl:     naproxen (NAPROSYN) 500 MG tablet, naproxen 500 mg tablet, Disp: , Rfl:     risedronate (ACTONEL) 35 MG tablet, Take one tablet weekly on an empty stomach, with a large glass of water, and do not recline for 30 minutes. (Patient not taking: No sig reported), Disp: 12 tablet, Rfl: 3    tadalafiL (CIALIS) 10 MG tablet, Take 1 tablet (10 mg total) by mouth as needed for Erectile Dysfunction., Disp: 20 tablet, Rfl: 1  Review of patient's allergies indicates:   Allergen Reactions    Sulfa (sulfonamide antibiotics) Rash     Past Medical History:   Diagnosis Date    Allergic sinusitis     Chronic    Snell's esophagus     on EGD    Colon polyps     Detached vitreous humor, bilateral     GERD (gastroesophageal reflux disease)     HH    Giardia 2014    HIV infection 11/1992    H/o HPV and KS (prior meds:indiavir, com/nfv, then atripla 06/2013    Hyperlipidemia     Mild pulmonary hypertension 03/2010    Found on Echo    Neurofibromatosis     Olecranon bursitis, left elbow 10/2015    CINDY (obstructive sleep  apnea)     Not using CPAP    Restless leg syndrome     Rib fractures 2020    Squamous cell carcinoma 07/2019    forehead    Abdul-Leeroy syndrome     cause unclear    Tinnitus     Vitamin D deficiency         Past Surgical History:   Procedure Laterality Date    Adenomatous Polyp  04/2007    (Santhosh)    COLONOSCOPY  03/2004    FOOT NEUROMA SURGERY      PROSTATE BIOPSY      Negative    Repair of Deviated Septum/Uvuloplasty  02/2011    Fatmata    TONSILLECTOMY      UVULECTOMY  2011         , colonoscopy with adenomatous polyp    Social History     Socioeconomic History    Marital status: Single   Tobacco Use    Smoking status: Never Smoker    Smokeless tobacco: Never Used   Substance and Sexual Activity    Alcohol use: Yes     Comment: 3-5 times a week    Drug use: No     Family History   Problem Relation Age of Onset    Hypertension Mother     Hypertension Father     Neurofibromatosis Sister         Younger sister, different sister with glioblastoma    Diabetes Paternal Grandmother     Prostate cancer Paternal Uncle     Multiple family members with neurofibromatosis    Travel History:   Vaccine History: Yearly flu vaccine, Menactra 2011, typhoid 2011, Pneumovax 2007,2018 , Prevnar 2014, tdap 2016, hepatitis A 1 into 2005, Zostavax 2014, hepatitis B series nonresponder, repeated 2016 nonresponder, shingrix #1 and 2 2018, MMR 11/2019, COVID x 2 4/2021, #3.   Advanced Directive:   Safer Sex: Abstinent  Bone Density: Osteopenia 2011, 2015, 12/2018  IMPRESSION:   The T-score of the L1-L4 vertebral bodies is -1.0. The T-score of the left  femoral neck is calculated to be   -1.4.   The T-score of the right femoral neck is calculated to be -1.0. The patient is still considered osteopenic according to World Health Organization guidelines and insufficiency fracture risk remains moderate.  Colonoscopy: 2004, 2007, 2015, 2021(Dr. Zuleta)      :Review of Systems    Constitutional: No fever, chills,  sweats, fatigue, weakness, weight loss.  .minimal exercise. Joined health [employer], has not gone yet   Eyes: No change in vision, loss of vision, diplopia, photophobia. Due  eye exam    ENT: No sinus drainage, sore throat, mouth pain, or lesions. due dental exam. Mild hearing loss.wears hearing aids     Cardiovascular: No chest pain, occasional  KEENAN, palpitations  And has still been having occasional LE edema.  No orthopnea. Normal EF 12/2019. Normal PFTs. Has not seen cardiologist, and needs replacement for his previous cardiologist who retired.     Respiratory: No shortness of breath,   rare Cough,    He is uncomfortable with CPAP mask.  He knows he needs to pursue a different device and still has not done so(years now).      Gastrointestinal: No abdominal pain, nausea, vomiting,  constipation, had a colonoscopy per Dr. Song esquivel of 2021.   No blood in stool, and occasional loose stools    Genitourinary: No dysuria, hematuria, incontinence, frequency, flank pain,   1-2 nocturia    Musculoskeletal: No new pain, joint swelling, and saw ortho for shoulder  Integumentary:  2 tiny papules on his left lower neck.  Sometimes they itch. They aren't changing. He can not tell if they are neurofibromas or not.  Has seen derm and does so fairly regularly and these are not new    Vascular: no Claudication or major varicosities. Has less accumulation of fluid at the end of the day, and it resolves by the next morning. .     Neurological: No unusual headaches, neuropathy, or falls. . He saw his neurologist in 2022, and had last MRI was 2019   Psychiatric:  Taking buspar instead of alprazolam, and has no more alprazolam.  12 beers per week, same as before  . Previously Discussed non pharmacologic ways to manage anxiety,s tress like counseling      Endocrine: NO diabetes, Thyroid normal     Lymphatic: No lymphadenopathy, blood loss, anemia, or malignancy    Objective:      Blood pressure 118/64, pulse 75, temperature 98.3 °F (36.8  "°C), height 5' 7" (1.702 m), weight 86.4 kg (190 lb 8 oz), SpO2 98 %. Body mass index is 29.84 kg/m².  Physical Exam      General: Alert and attentive, cooperative and in no distress. Moderate lipoatrophy of face and limbs, increased girth    Eyes: Pupils equal, round, reactive to light, anicteric, EOMI  .    Neck: Supple, non-tender, no thyromegaly or masses. No JVD    ENT: EAC patent, TM normal, nares patent, no oral lesions, teeth in good condition, no thrush. Hearing aids bilaterally    Cardiovascular: Regular rate and rhythm, no murmurs, rubs, or gallop    Respiratory: Lungs clear without wheezes, no rales, rub or rhonci.   Gastrointestinal: Active bowel sounds, soft, no mass or organomegaly, no distention.protuberant    Genitourinary: No  flank tenderness    Vascular: mild LE edema,no  phlebitis, pulses normal. Warm and well perfused, no calf or popliteal tenderness, no cords.    Musculoskeletal: Ambulates without difficulty, no acute arthritis, synovitis or myositis. Normal muscle bulk and strength    Integumentary: Skin without rashes . He has seborrheic keratoses on his upper chest.  Does have a number of neurofibromas. Small papules on his neck as well and hand which may be neurofibromas.     AnusRectum: last prostate check per primary, rectal per colonoscopy    Neurological: Normal LOC, cranial nerves, speech, reflexes, normal gait. .    Psychiatric: Normal mood, speech, demeanor    Lymphatic: No cervical, supraclavicular, axillary, or inguinal lymphadenopathy      Wound:     HIV Table:   HLA-B 5701     TOXOIgG  neg   RPR 11/2020 non reactive   HEP A IgG  vaccinated 2005, 2012   HBsAg 8/14/2015 neg, vacc 1980's, 2015 x3   HBsAb 6/19/17 neg, nonresponder after 2 series    HBcAb 8/18/2015 negative   HBeAb     HBeAg     HCVAb 12/2018 negative   HBV DNA     HCV RNA     Vitamin D       Chlamydia / GC 4/3/2015 negative   TB Gold  8/2021      negative  PSA   8/2021  3.7      Results for ALVARADO CORDOVA (MRN " [id number]) as of 6/18/2020 22:02   Ref. Range 11/8/2019 07:12   Rubeola IgG Latest Units: AU/mL <25.00 (L)     Recent Diagnostics: reviewed  12/31/19 TTE   1. Normal left ventricular systolic function.   2. Mildly elevated systolic pulmonary artery pressure.  37   3. Right ventricular systolic function appears normal.    12/31/19 ETT  1. Normal control electrocardiogram.  2. Treadmill stress ECG is negative for ischemia.  3. Functional capacity is good for age.    Assessment and Plan:           HIV infection, unspecified symptom status  -     CBC Auto Differential; Future; Expected date: 06/23/2022  -     Comprehensive Metabolic Panel; Future; Expected date: 06/23/2022  -     Lipid Panel; Future; Expected date: 06/23/2022  -     HIV RNA, Quantitative, PCR; Future; Expected date: 06/23/2022  -     RPR; Future; Expected date: 06/23/2022  -     Urinalysis; Future; Expected date: 06/23/2022  -     Hepatitis C Antibody; Future; Expected date: 06/23/2022  -     Vitamin D; Future; Expected date: 06/23/2022    Osteopenia, unspecified location  -     Vitamin D; Future; Expected date: 06/23/2022    Hyperlipidemia, unspecified hyperlipidemia type  -     Lipid Panel; Future; Expected date: 06/23/2022    Encounter for long-term (current) use of medications    Age-related osteoporosis without current pathological fracture     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  Dispense: 20 tablet; Refill: 1       Same medications    Vitamin D 5000 IU per day  Vitamin C 500-1000 mg  Zinc 25-50 mg daily    If it is vitamin B9 that the ENT recommended, then folate 1 mg per day is the right dose    take 81 mg aspirin per day    Cialis 10 mg was sent to your pharmacy  Lab at [facility] is  fasting

## 2022-06-23 NOTE — PATIENT INSTRUCTIONS
Same medications    Vitamin D 5000 IU per day  Vitamin C 500-1000 mg  Zinc 25-50 mg daily    If it is vitamin B9 that the ENT recommended, then folate 1 mg per day is the right dose    take 81 mg aspirin per day    Cialis 10 mg was sent to your pharmacy  Lab at [facility] is fasting

## 2022-06-29 LAB
25(OH)D3 SERPL-MCNC: 53 NG/ML (ref 30–100)
ALBUMIN SERPL-MCNC: 4 G/DL (ref 3.6–5.1)
ALBUMIN/GLOB SERPL: 1.8 (CALC) (ref 1–2.5)
ALP SERPL-CCNC: 46 U/L (ref 35–144)
ALT SERPL-CCNC: 16 U/L (ref 9–46)
APPEARANCE UR: CLEAR
AST SERPL-CCNC: 15 U/L (ref 10–35)
BASOPHILS # BLD AUTO: 48 CELLS/UL (ref 0–200)
BASOPHILS NFR BLD AUTO: 0.7 %
BILIRUB SERPL-MCNC: 0.4 MG/DL (ref 0.2–1.2)
BILIRUB UR QL STRIP: NEGATIVE
BUN SERPL-MCNC: 13 MG/DL (ref 7–25)
BUN/CREAT SERPL: ABNORMAL (CALC) (ref 6–22)
CALCIUM SERPL-MCNC: 8.7 MG/DL (ref 8.6–10.3)
CHLORIDE SERPL-SCNC: 108 MMOL/L (ref 98–110)
CHOLEST SERPL-MCNC: 161 MG/DL
CHOLEST/HDLC SERPL: 3.9 (CALC)
CO2 SERPL-SCNC: 26 MMOL/L (ref 20–32)
COLOR UR: YELLOW
CREAT SERPL-MCNC: 0.85 MG/DL (ref 0.7–1.25)
EOSINOPHIL # BLD AUTO: 152 CELLS/UL (ref 15–500)
EOSINOPHIL NFR BLD AUTO: 2.2 %
ERYTHROCYTE [DISTWIDTH] IN BLOOD BY AUTOMATED COUNT: 13.3 % (ref 11–15)
GLOBULIN SER CALC-MCNC: 2.2 G/DL (CALC) (ref 1.9–3.7)
GLUCOSE SERPL-MCNC: 101 MG/DL (ref 65–99)
GLUCOSE UR QL STRIP: NEGATIVE
HCT VFR BLD AUTO: 48.4 % (ref 38.5–50)
HCV AB S/CO SERPL IA: 0.02
HCV AB SERPL QL IA: NORMAL
HDLC SERPL-MCNC: 41 MG/DL
HGB BLD-MCNC: 15.5 G/DL (ref 13.2–17.1)
HGB UR QL STRIP: NEGATIVE
HIV1 RNA # SERPL NAA+PROBE: NOT DETECTED COPIES/ML
HIV1 RNA SERPL NAA+PROBE-LOG#: NOT DETECTED LOG COPIES/ML
KETONES UR QL STRIP: NEGATIVE
LDLC SERPL CALC-MCNC: 87 MG/DL (CALC)
LEUKOCYTE ESTERASE UR QL STRIP: NEGATIVE
LYMPHOCYTES # BLD AUTO: 2546 CELLS/UL (ref 850–3900)
LYMPHOCYTES NFR BLD AUTO: 36.9 %
MCH RBC QN AUTO: 28.4 PG (ref 27–33)
MCHC RBC AUTO-ENTMCNC: 32 G/DL (ref 32–36)
MCV RBC AUTO: 88.8 FL (ref 80–100)
MONOCYTES # BLD AUTO: 718 CELLS/UL (ref 200–950)
MONOCYTES NFR BLD AUTO: 10.4 %
NEUTROPHILS # BLD AUTO: 3436 CELLS/UL (ref 1500–7800)
NEUTROPHILS NFR BLD AUTO: 49.8 %
NITRITE UR QL STRIP: NEGATIVE
NONHDLC SERPL-MCNC: 120 MG/DL (CALC)
PH UR STRIP: 6 [PH] (ref 5–8)
PLATELET # BLD AUTO: 216 THOUSAND/UL (ref 140–400)
PMV BLD REES-ECKER: 9.9 FL (ref 7.5–12.5)
POTASSIUM SERPL-SCNC: 4 MMOL/L (ref 3.5–5.3)
PROT SERPL-MCNC: 6.2 G/DL (ref 6.1–8.1)
PROT UR QL STRIP: NEGATIVE
RBC # BLD AUTO: 5.45 MILLION/UL (ref 4.2–5.8)
RPR SER QL: NORMAL
SODIUM SERPL-SCNC: 141 MMOL/L (ref 135–146)
SP GR UR STRIP: 1.01 (ref 1–1.03)
TRIGL SERPL-MCNC: 251 MG/DL
WBC # BLD AUTO: 6.9 THOUSAND/UL (ref 3.8–10.8)

## 2022-07-11 RX ORDER — BUSPIRONE HYDROCHLORIDE 5 MG/1
5 TABLET ORAL 2 TIMES DAILY PRN
Qty: 60 TABLET | Refills: 5 | Status: SHIPPED | OUTPATIENT
Start: 2022-07-11 | End: 2023-07-17 | Stop reason: SDUPTHER

## 2022-07-18 ENCOUNTER — PATIENT MESSAGE (OUTPATIENT)
Dept: INFECTIOUS DISEASES | Facility: HOSPITAL | Age: 67
End: 2022-07-18

## 2022-08-09 DIAGNOSIS — E78.5 HYPERLIPIDEMIA, UNSPECIFIED HYPERLIPIDEMIA TYPE: Primary | ICD-10-CM

## 2022-08-09 RX ORDER — PRAVASTATIN SODIUM 40 MG/1
40 TABLET ORAL DAILY
Qty: 30 TABLET | Refills: 6 | Status: SHIPPED | OUTPATIENT
Start: 2022-08-09 | End: 2023-02-06 | Stop reason: SDUPTHER

## 2022-08-17 RX ORDER — PANTOPRAZOLE SODIUM 40 MG/1
40 TABLET, DELAYED RELEASE ORAL DAILY
Qty: 90 TABLET | Refills: 3 | Status: SHIPPED | OUTPATIENT
Start: 2022-08-17 | End: 2023-05-31 | Stop reason: SDUPTHER

## 2022-10-03 DIAGNOSIS — B20 HIV INFECTION, UNSPECIFIED SYMPTOM STATUS: Primary | ICD-10-CM

## 2022-10-03 RX ORDER — BICTEGRAVIR SODIUM, EMTRICITABINE, AND TENOFOVIR ALAFENAMIDE FUMARATE 50; 200; 25 MG/1; MG/1; MG/1
1 TABLET ORAL DAILY
Qty: 90 TABLET | Refills: 1 | Status: SHIPPED | OUTPATIENT
Start: 2022-10-03 | End: 2023-04-01

## 2022-12-08 ENCOUNTER — TELEPHONE (OUTPATIENT)
Dept: INFECTIOUS DISEASES | Facility: CLINIC | Age: 67
End: 2022-12-08

## 2022-12-08 DIAGNOSIS — E78.5 HYPERLIPIDEMIA, UNSPECIFIED HYPERLIPIDEMIA TYPE: ICD-10-CM

## 2022-12-08 DIAGNOSIS — Z12.5 SCREENING FOR PROSTATE CANCER: ICD-10-CM

## 2022-12-08 DIAGNOSIS — B20 HIV INFECTION, UNSPECIFIED SYMPTOM STATUS: Primary | ICD-10-CM

## 2022-12-14 LAB
ALBUMIN SERPL-MCNC: 3.8 G/DL (ref 3.6–5.1)
ALBUMIN/GLOB SERPL: 1.5 (CALC) (ref 1–2.5)
ALP SERPL-CCNC: 54 U/L (ref 35–144)
ALT SERPL-CCNC: 18 U/L (ref 9–46)
AST SERPL-CCNC: 20 U/L (ref 10–35)
BASOPHILS # BLD AUTO: 70 CELLS/UL (ref 0–200)
BASOPHILS NFR BLD AUTO: 1.1 %
BILIRUB SERPL-MCNC: 0.5 MG/DL (ref 0.2–1.2)
BUN SERPL-MCNC: 17 MG/DL (ref 7–25)
BUN/CREAT SERPL: ABNORMAL (CALC) (ref 6–22)
CALCIUM SERPL-MCNC: 8.1 MG/DL (ref 8.6–10.3)
CHLORIDE SERPL-SCNC: 108 MMOL/L (ref 98–110)
CHOLEST SERPL-MCNC: 130 MG/DL
CHOLEST/HDLC SERPL: 3.6 (CALC)
CO2 SERPL-SCNC: 27 MMOL/L (ref 20–32)
CREAT SERPL-MCNC: 0.96 MG/DL (ref 0.7–1.35)
EGFR: 86 ML/MIN/1.73M2
EOSINOPHIL # BLD AUTO: 243 CELLS/UL (ref 15–500)
EOSINOPHIL NFR BLD AUTO: 3.8 %
ERYTHROCYTE [DISTWIDTH] IN BLOOD BY AUTOMATED COUNT: 13.7 % (ref 11–15)
GLOBULIN SER CALC-MCNC: 2.5 G/DL (CALC) (ref 1.9–3.7)
GLUCOSE SERPL-MCNC: 96 MG/DL (ref 65–99)
HCT VFR BLD AUTO: 44.9 % (ref 38.5–50)
HDLC SERPL-MCNC: 36 MG/DL
HGB BLD-MCNC: 15.4 G/DL (ref 13.2–17.1)
HIV1 RNA # SERPL NAA+PROBE: NOT DETECTED COPIES/ML
HIV1 RNA SERPL NAA+PROBE-LOG#: NOT DETECTED LOG COPIES/ML
LDLC SERPL CALC-MCNC: 70 MG/DL (CALC)
LYMPHOCYTES # BLD AUTO: 2387 CELLS/UL (ref 850–3900)
LYMPHOCYTES NFR BLD AUTO: 37.3 %
MCH RBC QN AUTO: 30.9 PG (ref 27–33)
MCHC RBC AUTO-ENTMCNC: 34.3 G/DL (ref 32–36)
MCV RBC AUTO: 90 FL (ref 80–100)
MONOCYTES # BLD AUTO: 730 CELLS/UL (ref 200–950)
MONOCYTES NFR BLD AUTO: 11.4 %
NEUTROPHILS # BLD AUTO: 2970 CELLS/UL (ref 1500–7800)
NEUTROPHILS NFR BLD AUTO: 46.4 %
NONHDLC SERPL-MCNC: 94 MG/DL (CALC)
PLATELET # BLD AUTO: 201 THOUSAND/UL (ref 140–400)
PMV BLD REES-ECKER: 9.4 FL (ref 7.5–12.5)
POTASSIUM SERPL-SCNC: 4.3 MMOL/L (ref 3.5–5.3)
PROT SERPL-MCNC: 6.3 G/DL (ref 6.1–8.1)
PSA SERPL-MCNC: 4.41 NG/ML
RBC # BLD AUTO: 4.99 MILLION/UL (ref 4.2–5.8)
SODIUM SERPL-SCNC: 141 MMOL/L (ref 135–146)
TRIGL SERPL-MCNC: 164 MG/DL
WBC # BLD AUTO: 6.4 THOUSAND/UL (ref 3.8–10.8)

## 2022-12-19 ENCOUNTER — OFFICE VISIT (OUTPATIENT)
Dept: INFECTIOUS DISEASES | Facility: CLINIC | Age: 67
End: 2022-12-19
Payer: MEDICARE

## 2022-12-19 VITALS
SYSTOLIC BLOOD PRESSURE: 124 MMHG | TEMPERATURE: 99 F | WEIGHT: 191.63 LBS | DIASTOLIC BLOOD PRESSURE: 68 MMHG | OXYGEN SATURATION: 98 % | BODY MASS INDEX: 30.01 KG/M2 | HEART RATE: 103 BPM

## 2022-12-19 DIAGNOSIS — B20 LIPODYSTROPHY ASSOCIATED WITH HUMAN IMMUNODEFICIENCY VIRUS INFECTION: ICD-10-CM

## 2022-12-19 DIAGNOSIS — E88.1 LIPODYSTROPHY ASSOCIATED WITH HUMAN IMMUNODEFICIENCY VIRUS INFECTION: ICD-10-CM

## 2022-12-19 DIAGNOSIS — Z71.89 EDUCATED ABOUT COVID-19 VIRUS INFECTION: ICD-10-CM

## 2022-12-19 DIAGNOSIS — M81.0 AGE-RELATED OSTEOPOROSIS WITHOUT CURRENT PATHOLOGICAL FRACTURE: ICD-10-CM

## 2022-12-19 DIAGNOSIS — Z79.899 ENCOUNTER FOR LONG-TERM (CURRENT) USE OF MEDICATIONS: ICD-10-CM

## 2022-12-19 DIAGNOSIS — B20 HIV INFECTION, UNSPECIFIED SYMPTOM STATUS: Primary | ICD-10-CM

## 2022-12-19 DIAGNOSIS — G47.33 OSA (OBSTRUCTIVE SLEEP APNEA): ICD-10-CM

## 2022-12-19 DIAGNOSIS — E78.5 HYPERLIPIDEMIA, UNSPECIFIED HYPERLIPIDEMIA TYPE: ICD-10-CM

## 2022-12-19 PROCEDURE — 99214 OFFICE O/P EST MOD 30 MIN: CPT | Mod: S$GLB,,, | Performed by: INTERNAL MEDICINE

## 2022-12-19 PROCEDURE — 99214 PR OFFICE/OUTPT VISIT, EST, LEVL IV, 30-39 MIN: ICD-10-PCS | Mod: S$GLB,,, | Performed by: INTERNAL MEDICINE

## 2022-12-19 NOTE — PROGRESS NOTES
Subjective:       Patient ID: Dmitry Epps is a 67 y.o. male.    Chief Complaint:: HIV Positive     Since last visit. He did not see Dr. Trevino nor have his MRI , missed his appointment  Did not have lab as requested  Colonoscopy isn't until 2020  Has not reduced alcohol yet  Had yearly physical with Dr. De Paz in September and did not have his lab orders either. Prostate LOU was normal. Had a stomach bug that day.     7/12/19: just had a Squamous cell cancer removed from his forehead.   He saw Dr. Trevino and no additional MRI was needed, next visit in 1/2020, the previous MRI was stable.   Bone density still has osteopenia, and he is compliant with vitamin D and calcium  He has cut back on beer a little , but states he needs to cut back more. He is not exercising though his job is an active one.  He does not feel rested, but hasn't worn CPAP in years. He is open toseeing the sleep doctor again and trying a new device, etc.   He still feels bloated when eating, and acknowledges that he eats quickly.     1/17/20: cutting back on beer even more. He did join an exercise club that started last night, with a goal of losing 40-45#, BMI 29. Did not make an appointment with sleep doctor, but he plans to. He     Had a syncopal episode about 3 months ago. His prior cardiologist retired and has seen Dr. Turner. He cannot tell me that he told the cardiologist and his notes do not reflect this but subsequently remembers telling the cardiologist that he had lightheadedness.. Had a fracture of the left wrist and had bumps on his head. He went to an urgent care.  He was referred to orthopedist, Dr. Montanez.  He had difficulty getting adequate pain relief.  The cardiologist did an echo and a stress test which were ok, at [facility]. He has episodes of lightheadedness and pre-syncope and mostly when he is getting up from bed in the morning. There are times when he describes vertigo too with associated changes in position or changes of  his head or movements of his eye. Changing from atripla to biktarvy has not been associated with a change (might have expected this on atripla but not Biktarvy). He has not noted a pattern, ie after an evening of beer and not drinking enough fluids but he may keep a diary of this.    Reviewed 11/2019 lab and vitamin D low, viral load undetectable. Lipids were better, PSA was fine. Switched to biktarvy in September. Did receive a flu vaccine and a MMR booster.     6/18/20: he has continued to work through the COVID pandemic, but he is protecting himself in public places, except neighborhood bar. His gym closed and he gained back the weight that he had lost. During the work outs he fractured 7-9 ribs (no fall)his BMD in 12/2018 was roughly the same as the test in 2015 12/17/20: since last visit he had COVID on 11/9/20. He felt poorly for 3 days before then and for 2 weeks after. He has had negative COVID 12/7, and IgG positive 12/9.   He had a virtual visit with his cardiologist. STress test was ok and echo showed mild pulmonary hypertension, 36 mmHg. He did wear CPAP while he had COVID, but now the mask is very irritating. He can wear it sometimes up to 6 hours. He did have flu vax.  He has been taking the Remeron at night and alprazolam 0.5 in the morning, almost every morning that he goes to work because of the tremendous anxiety and stress that he faces when he goes to work.  We discussed in detail that this is not a fruitful strategy for handling daily stress as alprazolam is habit forming.  I was under the impression it was for bedtime.  We discussed at length:  His discussing with his superior ways to reduce his stress.  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  We discussed counseling to address coping mechanism and alternative medications such as an SSRI or buspirone which are not habit forming.  He is willing.  He has decreased the amount of beer that  he drinks at night and the frequency with which he visits the local bar.    6/17/21: cutting back on remeron at bedtime to reduce daytime fatigue. He has untreated sleep apnea since he stopped using it after COVID. He spoke to his respiratory provider. He went to his father's home in [place] for 2 weeks to help him, which was exhausting. He is 88 years old. He has not yet resumed the CPAP but he may need new equipment or a repeat sleep test.  He states he will work on that this week  He is having achilles pain, working on this with podiatry, Josef Newton DPM, and Dr. Jones  He made an appt with Dr. Ho for colonoscopy 6/22. Sees Dr. Burris 8/11/21 in place of Dr. brewster'  Taking OTC Calcium plus D, but taking with Biktarvy    12/14/21: received COVID booster little more than 2 weeks ago.  Pain in right hand, neuralgia like, going to see Dr. Garcia. Also left shoulder pain, posteriorly for over a year or more. Does not remember an injury.   When he bends over, he feels winded. He was supposed to get CXR per PCP but has not had it. Discussed that this is mechanical in all likelihood.  He is compliant with his HIV medicine he is not compliant with CPAP.  He is getting little exercise, he is still drinking a moderate amount of alcohol.    6/23/22: discussed vitamins, COVID vaccine, monkeypox, shingles and vaccines  Spot on chest, seeing dermatologist. One occasionally itches.  It is the same as last roma   Left shoulder pain, seen by orthopedics. Has not had a bone density yet (last was 2018), given an order last visit. Had a fall, head trauma, may have injured nasal septum. Seen in ED and had CT of head and cervical spine 3/21, had to have stitches in lip. PFTs in march were normal([facility])    12/19/22; did receive flu vaccine. Had COVID 3 weeks ago, 11/29, seen at urgent care, given paxlovid rx but felt better so he did not fill. He still has chest congestion(did not call me). Taken mucinex D, 2-3 liquid  decongestants, cough suppressants. He coughs periodically, sputum is minimal, rare light yellow. No fever. No SOB except after a coughing jag. He hears himself wheeze occasionally. No pleurisy, no hemoptysis. Wearing CPAP again but dislikes it greatly. Remeron no longer helping him sleep. I discontinued his xanax because of continued moderate-heavy alcohol use and sub buspar.   Reviewed lab, PSA was a little higher, 4.41. seen by urology in sept after 4.13 PSA. Nocturia x 2-3.       Current Outpatient Medications:     BIKTARVY -25 mg (25 kg or greater), Take 1 tablet by mouth once daily., Disp: 90 tablet, Rfl: 1    busPIRone (BUSPAR) 5 MG Tab, Take 1 tablet (5 mg total) by mouth 2 (two) times daily as needed (anxiety)., Disp: 60 tablet, Rfl: 5    mirtazapine (REMERON SOL-TAB) 30 MG disintegrating tablet, Take 15 mg by mouth nightly. , Disp: , Rfl:     pantoprazole (PROTONIX) 40 MG tablet, Take 1 tablet (40 mg total) by mouth once daily., Disp: 90 tablet, Rfl: 3    pravastatin (PRAVACHOL) 40 MG tablet, Take 1 tablet (40 mg total) by mouth once daily., Disp: 30 tablet, Rfl: 6    risedronate (ACTONEL) 35 MG tablet, Take one tablet weekly on an empty stomach, with a large glass of water, and do not recline for 30 minutes., Disp: 12 tablet, Rfl: 3    ALPRAZolam (XANAX) 0.25 MG tablet, TK 1 T PO  QHS PRF NEEDED FOR SLEEP, Disp: 30 tablet, Rfl: 1    calcium-vitamin D3 (OS-JOSÉ MIGUEL 500 + D3) 500 mg-5 mcg (200 unit) per tablet, Take 1 tablet by mouth 2 (two) times daily with meals., Disp: , Rfl:     ergocalciferol (ERGOCALCIFEROL) 50,000 unit Cap, ergocalciferol (vitamin D2) 50,000 unit capsule, Disp: , Rfl:     HYDROcodone-acetaminophen (NORCO)  mg per tablet, hydrocodone 10 mg-acetaminophen 325 mg tablet, Disp: , Rfl:     naproxen (NAPROSYN) 500 MG tablet, naproxen 500 mg tablet, Disp: , Rfl:     tadalafiL (CIALIS) 10 MG tablet, Take 1 tablet (10 mg total) by mouth as needed for Erectile Dysfunction. (Patient not  taking: Reported on 12/19/2022), Disp: 20 tablet, Rfl: 1  Review of patient's allergies indicates:   Allergen Reactions    Sulfa (sulfonamide antibiotics) Rash     Past Medical History:   Diagnosis Date    Allergic sinusitis     Chronic    Snell's esophagus     on EGD    Colon polyps     Detached vitreous humor, bilateral     GERD (gastroesophageal reflux disease)     HH    Giardia 2014    HIV infection 11/1992    H/o HPV and KS (prior meds:indiavir, com/nfv, then atripla 06/2013    Hyperlipidemia     Mild pulmonary hypertension 03/2010    Found on Echo    Neurofibromatosis     Olecranon bursitis, left elbow 10/2015    CINDY (obstructive sleep apnea)     Not using CPAP    Restless leg syndrome     Rib fractures 2020    Squamous cell carcinoma 07/2019    forehead    Abdul-Leeroy syndrome     cause unclear    Tinnitus     Vitamin D deficiency         Past Surgical History:   Procedure Laterality Date    Adenomatous Polyp  04/2007    ([facility])    COLONOSCOPY  03/2004    FOOT NEUROMA SURGERY      PROSTATE BIOPSY      Negative    Repair of Deviated Septum/Uvuloplasty  02/2011    [facility]    TONSILLECTOMY      UVULECTOMY  2011         , colonoscopy with adenomatous polyp    Social History     Socioeconomic History    Marital status: Single   Tobacco Use    Smoking status: Never    Smokeless tobacco: Never   Substance and Sexual Activity    Alcohol use: Yes     Comment: 3-5 times a week    Drug use: No     Family History   Problem Relation Age of Onset    Hypertension Mother     Hypertension Father     Neurofibromatosis Sister         Younger sister, different sister with glioblastoma    Diabetes Paternal Grandmother     Prostate cancer Paternal Uncle     Multiple family members with neurofibromatosis    Travel History:   Vaccine History: Yearly flu vaccine, Menactra 2011, typhoid 2011, Pneumovax 2007,2018 , Prevnar 2014, tdap 2016, hepatitis A 1 into 2005, Zostavax 2014, hepatitis B series nonresponder, repeated 2016  nonresponder, shingrix #1 and 2 2018, MMR 11/2019, COVID x 2 4/2021, #3.   Advanced Directive:   Safer Sex: Abstinent  Bone Density: Osteopenia 2011, 2015, 12/2018  IMPRESSION:   The T-score of the L1-L4 vertebral bodies is -1.0. The T-score of the left  femoral neck is calculated to be   -1.4.   The T-score of the right femoral neck is calculated to be -1.0. The patient is still considered osteopenic according to World Health Organization guidelines and insufficiency fracture risk remains moderate.  Colonoscopy: 2004, 2007, 2015, 2021(Dr. Zuleta)      :Review of Systems    Constitutional: No fever, chills, sweats, fatigue, weakness, weight loss.  .minimal exercise. Joined health club over 6 months ago, has not gone yet   Eyes: No change in vision, loss of vision, diplopia, photophobia. Due  eye exam    ENT: No sinus drainage, sore throat, mouth pain, or lesions. due dental exam. Mild hearing loss.wears hearing aids       Cardiovascular: No chest pain, occasional  KEENAN, palpitations  And has still been having occasional LE edema.  No orthopnea. Normal EF 12/2019. Normal PFTs. Has not seen cardiologist, and needs replacement for his previous cardiologist who retired.     Respiratory: No shortness of breath,   rare Cough,    He is uncomfortable with CPAP mask but he is wearing it, since seeing Dr. Mcdonald, over a month, the current mask he has is over the mouth and nose and he wants to have one just over nose. Per his wrist measuring device, he gets little deep sleep, waking up multiple times, no change with CPAP. Understands that alcohol reduces quality of sleep but hasnt really changed his habits    Gastrointestinal: No abdominal pain, nausea, vomiting,  constipation, had a colonoscopy per Dr. Zuleta fall of 2021.   No blood in stool, and occasional loose stools    Genitourinary: No dysuria, hematuria, incontinence, frequency, flank pain,   1-2 nocturia    Musculoskeletal: No new pain, joint swelling, and saw ortho  for shoulder    Integumentary:  2 tiny papules on his left lower neck.  Sometimes they itch. Has an itchy papule in sternal notch.They aren't changing. He can not tell if they are neurofibromas or not.  Has seen derm and does so fairly regularly and these are not new    Vascular: no Claudication or major varicosities.  .     Neurological: No unusual headaches, neuropathy, or falls. . He saw his neurologist in 2022, and had last MRI was 2019   Psychiatric:  Taking buspar . 12 beers per week, same as before  . Previously Discussed non pharmacologic ways to manage anxiety,s tress like counseling      Endocrine: NO diabetes, Thyroid normal     Lymphatic: No lymphadenopathy, blood loss, anemia, or malignancy    Objective:      Blood pressure 124/68, pulse 103, temperature 98.8 °F (37.1 °C), weight 86.9 kg (191 lb 9.6 oz), SpO2 98 %. Body mass index is 30.01 kg/m².  Physical Exam      General: Alert and attentive, cooperative and in no distress. Moderate lipoatrophy of face and limbs, increased girth    Eyes: Pupils equal, round, reactive to light, anicteric, EOMI  .    Neck: Supple, non-tender, no thyromegaly or masses. No JVD    ENT: EAC patent, TM normal, nares patent, no oral lesions, teeth in good condition, no thrush. Hearing aids . Still speaks loudly. Has erythematous posterior pharynx. Dry cough    Cardiovascular: Regular rate and rhythm, no murmurs, rubs, or gallop    Respiratory: Lungs clear without wheezes, no rales, rub or rhonci.   Gastrointestinal: Active bowel sounds, soft, no mass or organomegaly, no distention.protuberant    Genitourinary: No  flank tenderness    Vascular: mild LE edema,no  phlebitis, pulses normal. Warm and well perfused, no calf or popliteal tenderness, no cords.    Musculoskeletal: Ambulates without difficulty, no acute arthritis, synovitis or myositis. Normal muscle bulk and strength    Integumentary: Skin without rashes . He has seborrheic keratoses on his upper chest.  Does have a  number of neurofibromas. Small papules on his neck as well and hand which may be neurofibromas.     AnusRectum: last prostate check per primary, rectal per colonoscopy    Neurological: Normal LOC, cranial nerves, speech, reflexes, normal gait. .    Psychiatric: Normal mood, speech, demeanor    Lymphatic: No cervical, supraclavicular, axillary, or inguinal lymphadenopathy      Wound:     HIV Table:   HLA-B 5701     TOXOIgG  neg   RPR 11/2020 non reactive   HEP A IgG  vaccinated 2005, 2012   HBsAg 8/14/2015 neg, vacc 1980's, 2015 x3   HBsAb 6/19/17 neg, nonresponder after 2 series    HBcAb 8/18/2015 negative   HBeAb     HBeAg     HCVAb 12/2018 negative   HBV DNA     HCV RNA     Vitamin D       Chlamydia / GC 4/3/2015 negative   TB Gold  8/2021      negative  PSA   8/2021  3.7      Results for ALVARADO CORDOVA (MRN [id number]) as of 6/18/2020 22:02   Ref. Range 11/8/2019 07:12   Rubeola IgG Latest Units: AU/mL <25.00 (L)     Recent Diagnostics: reviewed  12/31/19 TTE   1. Normal left ventricular systolic function.   2. Mildly elevated systolic pulmonary artery pressure.  37   3. Right ventricular systolic function appears normal.    12/31/19 ETT  1. Normal control electrocardiogram.  2. Treadmill stress ECG is negative for ischemia.  3. Functional capacity is good for age.    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    Encounter for long-term (current) use of medications    Educated about COVID-19 virus infection    CINDY (obstructive sleep apnea)    Other orders  -     (In Office Administered) Pneumococcal Conjugate Vaccine (20 Valent) (IM)      Use nasal steroid while you are recovering from your respiratory infection (like flonase or budesonide nasal spray)  This will reduce inflammation in your nose and  thereby give your bronchial tubes some relief    Mucinex(guaifenesin, robitussin) makes the mucous thinner so that you can cough it out or blow it out    Decongestants (sudafed) can help unblock stuffy sinuses or ears, can decrease the quality of your sleep and raise your blood pressure.    Antihistamines(claritin,zyrtec or allegra)  dry up the nose so that there is less drip and indirectly, less cough    Breo Ellipta inhaler: 1 puff daily, can open up the bronchial tubes and reduce inflammation in your airways to help with the cough and congestion  Sample given and demonstrated    Cough medication with dextromethorphan(DM) suppresses cough when you are coughing too frequently or can't sleep because of cough. This is often paired with guaifenesin.     Continue Biktarvy  Ask Dr. Mcdonald what is safe for you to take for sleep.   Return in 6 months

## 2022-12-19 NOTE — PATIENT INSTRUCTIONS
Use nasal steroid while you are recovering from your respiratory infection (like flonase or budesonide nasal spray)  This will reduce inflammation in your nose and thereby give your bronchial tubes some relief    Mucinex(guaifenesin, robitussin) makes the mucous thinner so that you can cough it out or blow it out    Decongestants (sudafed) can help unblock stuffy sinuses or ears, can decrease the quality of your sleep and raise your blood pressure.    Antihistamines(claritin,zyrtec or allegra)  dry up the nose so that there is less drip and indirectly, less cough    Breo Ellipta inhaler: 1 puff daily, can open up the bronchial tubes and reduce inflammation in your airways to help with the cough and congestion    Cough medication with dextromethorphan(DM) suppresses cough when you are coughing too frequently or can't sleep because of cough. This is often paired with guaifenesin.     Continue Biktarvy  Ask Dr. Mcdonald what is safe for you to take for sleep.   Return in 6 months

## 2023-02-06 DIAGNOSIS — E78.5 HYPERLIPIDEMIA, UNSPECIFIED HYPERLIPIDEMIA TYPE: Primary | ICD-10-CM

## 2023-02-06 RX ORDER — PRAVASTATIN SODIUM 40 MG/1
40 TABLET ORAL DAILY
Qty: 30 TABLET | Refills: 6 | Status: SHIPPED | OUTPATIENT
Start: 2023-02-06 | End: 2023-07-17 | Stop reason: SDUPTHER

## 2023-05-08 ENCOUNTER — TELEPHONE (OUTPATIENT)
Dept: INFECTIOUS DISEASES | Facility: CLINIC | Age: 68
End: 2023-05-08

## 2023-05-08 DIAGNOSIS — G47.00 INSOMNIA, UNSPECIFIED TYPE: ICD-10-CM

## 2023-05-08 RX ORDER — BICTEGRAVIR SODIUM, EMTRICITABINE, AND TENOFOVIR ALAFENAMIDE FUMARATE 50; 200; 25 MG/1; MG/1; MG/1
1 TABLET ORAL DAILY
Qty: 90 TABLET | Refills: 1 | Status: SHIPPED | OUTPATIENT
Start: 2023-05-08 | End: 2023-11-20 | Stop reason: SDUPTHER

## 2023-05-08 RX ORDER — ALPRAZOLAM 0.25 MG/1
TABLET ORAL
Qty: 30 TABLET | Refills: 1 | Status: CANCELLED | OUTPATIENT
Start: 2023-05-08

## 2023-05-31 RX ORDER — PANTOPRAZOLE SODIUM 40 MG/1
40 TABLET, DELAYED RELEASE ORAL DAILY
Qty: 90 TABLET | Refills: 3 | Status: SHIPPED | OUTPATIENT
Start: 2023-05-31 | End: 2023-11-20 | Stop reason: SDUPTHER

## 2023-06-05 ENCOUNTER — TELEPHONE (OUTPATIENT)
Dept: INFECTIOUS DISEASES | Facility: CLINIC | Age: 68
End: 2023-06-05

## 2023-06-05 DIAGNOSIS — B20 HIV INFECTION, UNSPECIFIED SYMPTOM STATUS: Primary | ICD-10-CM

## 2023-06-05 DIAGNOSIS — E78.5 HYPERLIPIDEMIA, UNSPECIFIED HYPERLIPIDEMIA TYPE: ICD-10-CM

## 2023-06-05 NOTE — TELEPHONE ENCOUNTER
Patient called [phone]     He has an appointment on 6/19/23 with Dr Kimball     He is requesting lab orders be sent to [facility]    Please Advise     SAM SAXENA 6/05/23

## 2023-06-16 LAB
ALBUMIN SERPL-MCNC: 3.8 G/DL (ref 3.6–5.1)
ALBUMIN/GLOB SERPL: 1.6 (CALC) (ref 1–2.5)
ALP SERPL-CCNC: 42 U/L (ref 35–144)
ALT SERPL-CCNC: 18 U/L (ref 9–46)
APPEARANCE UR: CLEAR
AST SERPL-CCNC: 15 U/L (ref 10–35)
BASOPHILS # BLD AUTO: 73 CELLS/UL (ref 0–200)
BASOPHILS NFR BLD AUTO: 1 %
BILIRUB SERPL-MCNC: 0.5 MG/DL (ref 0.2–1.2)
BILIRUB UR QL STRIP: NEGATIVE
BUN SERPL-MCNC: 13 MG/DL (ref 7–25)
BUN/CREAT SERPL: NORMAL (CALC) (ref 6–22)
CALCIUM SERPL-MCNC: 8.9 MG/DL (ref 8.6–10.3)
CHLORIDE SERPL-SCNC: 108 MMOL/L (ref 98–110)
CHOLEST SERPL-MCNC: 148 MG/DL
CHOLEST/HDLC SERPL: 3.4 (CALC)
CO2 SERPL-SCNC: 27 MMOL/L (ref 20–32)
COLOR UR: YELLOW
CREAT SERPL-MCNC: 0.89 MG/DL (ref 0.7–1.35)
EGFR: 93 ML/MIN/1.73M2
EOSINOPHIL # BLD AUTO: 197 CELLS/UL (ref 15–500)
EOSINOPHIL NFR BLD AUTO: 2.7 %
ERYTHROCYTE [DISTWIDTH] IN BLOOD BY AUTOMATED COUNT: 13.7 % (ref 11–15)
GAMMA INTERFERON BACKGROUND BLD IA-ACNC: 0.04 IU/ML
GLOBULIN SER CALC-MCNC: 2.4 G/DL (CALC) (ref 1.9–3.7)
GLUCOSE SERPL-MCNC: 99 MG/DL (ref 65–99)
GLUCOSE UR QL STRIP: NEGATIVE
HCT VFR BLD AUTO: 48.1 % (ref 38.5–50)
HDLC SERPL-MCNC: 43 MG/DL
HGB BLD-MCNC: 15.8 G/DL (ref 13.2–17.1)
HGB UR QL STRIP: NEGATIVE
HIV1 RNA # SERPL NAA+PROBE: NOT DETECTED COPIES/ML
HIV1 RNA SERPL NAA+PROBE-LOG#: NOT DETECTED LOG COPIES/ML
KETONES UR QL STRIP: NEGATIVE
LDLC SERPL CALC-MCNC: 81 MG/DL (CALC)
LEUKOCYTE ESTERASE UR QL STRIP: NEGATIVE
LYMPHOCYTES # BLD AUTO: 2876 CELLS/UL (ref 850–3900)
LYMPHOCYTES NFR BLD AUTO: 39.4 %
M TB IFN-G BLD-IMP: NEGATIVE
M TB IFN-G CD4+ BCKGRND COR BLD-ACNC: 0.02 IU/ML
M TB IFN-G CD4+CD8+ BCKGRND COR BLD-ACNC: 0.03 IU/ML
MCH RBC QN AUTO: 29.7 PG (ref 27–33)
MCHC RBC AUTO-ENTMCNC: 32.8 G/DL (ref 32–36)
MCV RBC AUTO: 90.4 FL (ref 80–100)
MITOGEN IGNF BCKGRD COR BLD-ACNC: 9.46 IU/ML
MONOCYTES # BLD AUTO: 701 CELLS/UL (ref 200–950)
MONOCYTES NFR BLD AUTO: 9.6 %
NEUTROPHILS # BLD AUTO: 3453 CELLS/UL (ref 1500–7800)
NEUTROPHILS NFR BLD AUTO: 47.3 %
NITRITE UR QL STRIP: NEGATIVE
NONHDLC SERPL-MCNC: 105 MG/DL (CALC)
PH UR STRIP: 5.5 [PH] (ref 5–8)
PLATELET # BLD AUTO: 219 THOUSAND/UL (ref 140–400)
PMV BLD REES-ECKER: 9.8 FL (ref 7.5–12.5)
POTASSIUM SERPL-SCNC: 4.3 MMOL/L (ref 3.5–5.3)
PROT SERPL-MCNC: 6.2 G/DL (ref 6.1–8.1)
PROT UR QL STRIP: NEGATIVE
RBC # BLD AUTO: 5.32 MILLION/UL (ref 4.2–5.8)
RPR SER QL: NORMAL
SODIUM SERPL-SCNC: 140 MMOL/L (ref 135–146)
SP GR UR STRIP: 1.02 (ref 1–1.03)
TRIGL SERPL-MCNC: 141 MG/DL
WBC # BLD AUTO: 7.3 THOUSAND/UL (ref 3.8–10.8)

## 2023-06-19 ENCOUNTER — OFFICE VISIT (OUTPATIENT)
Dept: INFECTIOUS DISEASES | Facility: CLINIC | Age: 68
End: 2023-06-19
Payer: MEDICARE

## 2023-06-19 VITALS
SYSTOLIC BLOOD PRESSURE: 120 MMHG | WEIGHT: 192.81 LBS | DIASTOLIC BLOOD PRESSURE: 68 MMHG | OXYGEN SATURATION: 95 % | BODY MASS INDEX: 30.2 KG/M2 | HEART RATE: 71 BPM

## 2023-06-19 DIAGNOSIS — B20 HIV INFECTION, UNSPECIFIED SYMPTOM STATUS: ICD-10-CM

## 2023-06-19 DIAGNOSIS — R59.0 LYMPHADENOPATHY, AXILLARY: Primary | ICD-10-CM

## 2023-06-19 DIAGNOSIS — E88.1 LIPODYSTROPHY ASSOCIATED WITH HUMAN IMMUNODEFICIENCY VIRUS INFECTION: ICD-10-CM

## 2023-06-19 DIAGNOSIS — Z56.6 STRESS AT WORK: ICD-10-CM

## 2023-06-19 DIAGNOSIS — G47.00 INSOMNIA, UNSPECIFIED TYPE: ICD-10-CM

## 2023-06-19 DIAGNOSIS — G47.33 OSA (OBSTRUCTIVE SLEEP APNEA): ICD-10-CM

## 2023-06-19 DIAGNOSIS — B20 LIPODYSTROPHY ASSOCIATED WITH HUMAN IMMUNODEFICIENCY VIRUS INFECTION: ICD-10-CM

## 2023-06-19 DIAGNOSIS — Z79.899 ENCOUNTER FOR LONG-TERM (CURRENT) USE OF MEDICATIONS: ICD-10-CM

## 2023-06-19 DIAGNOSIS — Q85.00 NEUROFIBROMATOSIS: ICD-10-CM

## 2023-06-19 DIAGNOSIS — E78.5 HYPERLIPIDEMIA, UNSPECIFIED HYPERLIPIDEMIA TYPE: ICD-10-CM

## 2023-06-19 PROCEDURE — 99214 PR OFFICE/OUTPT VISIT, EST, LEVL IV, 30-39 MIN: ICD-10-PCS | Mod: S$GLB,,, | Performed by: INTERNAL MEDICINE

## 2023-06-19 PROCEDURE — 99214 OFFICE O/P EST MOD 30 MIN: CPT | Mod: S$GLB,,, | Performed by: INTERNAL MEDICINE

## 2023-06-19 RX ORDER — LORAZEPAM 0.5 MG/1
0.5 TABLET ORAL EVERY 12 HOURS PRN
Qty: 30 TABLET | Refills: 1 | Status: SHIPPED | OUTPATIENT
Start: 2023-06-19 | End: 2023-12-14 | Stop reason: SDUPTHER

## 2023-06-19 RX ORDER — GABAPENTIN 100 MG/1
100 CAPSULE ORAL 3 TIMES DAILY
COMMUNITY

## 2023-06-19 SDOH — SOCIAL DETERMINANTS OF HEALTH (SDOH): OTHER PHYSICAL AND MENTAL STRAIN RELATED TO WORK: Z56.6

## 2023-06-19 NOTE — PROGRESS NOTES
Subjective:       Patient ID: Dmitry Epps is a 67 y.o. male.    Chief Complaint:: HIV Positive     Since last visit. He did not see Dr. Trevino nor have his MRI , missed his appointment  Did not have lab as requested  Colonoscopy isn't until 2020  Has not reduced alcohol yet  Had yearly physical with Dr. De Paz in September and did not have his lab orders either. Prostate LOU was normal. Had a stomach bug that day.     7/12/19: just had a Squamous cell cancer removed from his forehead.   He saw Dr. Trevino and no additional MRI was needed, next visit in 1/2020, the previous MRI was stable.   Bone density still has osteopenia, and he is compliant with vitamin D and calcium  He has cut back on beer a little , but states he needs to cut back more. He is not exercising though his job is an active one.  He does not feel rested, but hasn't worn CPAP in years. He is open toseeing the sleep doctor again and trying a new device, etc.   He still feels bloated when eating, and acknowledges that he eats quickly.     1/17/20: cutting back on beer even more. He did join an exercise club that started last night, with a goal of losing 40-45#, BMI 29. Did not make an appointment with sleep doctor, but he plans to. He     Had a syncopal episode about 3 months ago. His prior cardiologist retired and has seen Dr. Turner. He cannot tell me that he told the cardiologist and his notes do not reflect this but subsequently remembers telling the cardiologist that he had lightheadedness.. Had a fracture of the left wrist and had bumps on his head. He went to an urgent care.  He was referred to orthopedist, Dr. Montanez.  He had difficulty getting adequate pain relief.  The cardiologist did an echo and a stress test which were ok, at [facility]. He has episodes of lightheadedness and pre-syncope and mostly when he is getting up from bed in the morning. There are times when he describes vertigo too with associated changes in position or changes of  his head or movements of his eye. Changing from atripla to biktarvy has not been associated with a change (might have expected this on atripla but not Biktarvy). He has not noted a pattern, ie after an evening of beer and not drinking enough fluids but he may keep a diary of this.    Reviewed 11/2019 lab and vitamin D low, viral load undetectable. Lipids were better, PSA was fine. Switched to biktarvy in September. Did receive a flu vaccine and a MMR booster.     6/18/20: he has continued to work through the COVID pandemic, but he is protecting himself in public places, except neighborhood bar. His gym closed and he gained back the weight that he had lost. During the work outs he fractured 7-9 ribs (no fall)his BMD in 12/2018 was roughly the same as the test in 2015 12/17/20: since last visit he had COVID on 11/9/20. He felt poorly for 3 days before then and for 2 weeks after. He has had negative COVID 12/7, and IgG positive 12/9.   He had a virtual visit with his cardiologist. STress test was ok and echo showed mild pulmonary hypertension, 36 mmHg. He did wear CPAP while he had COVID, but now the mask is very irritating. He can wear it sometimes up to 6 hours. He did have flu vax.  He has been taking the Remeron at night and alprazolam 0.5 in the morning, almost every morning that he goes to work because of the tremendous anxiety and stress that he faces when he goes to work.  We discussed in detail that this is not a fruitful strategy for handling daily stress as alprazolam is habit forming.  I was under the impression it was for bedtime.  We discussed at length:  His discussing with his superior ways to reduce his stress.  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  We discussed counseling to address coping mechanism and alternative medications such as an SSRI or buspirone which are not habit forming.  He is willing.  He has decreased the amount of beer that  he drinks at night and the frequency with which he visits the local bar.    6/17/21: cutting back on remeron at bedtime to reduce daytime fatigue. He has untreated sleep apnea since he stopped using it after COVID. He spoke to his respiratory provider. He went to his father's home in [place] for 2 weeks to help him, which was exhausting. He is 88 years old. He has not yet resumed the CPAP but he may need new equipment or a repeat sleep test.  He states he will work on that this week  He is having achilles pain, working on this with podiatry, Josef Newton DPM, and Dr. Jones  He made an appt with Dr. Ho for colonoscopy 6/22. Sees Dr. Burris 8/11/21 in place of Dr. brewster'  Taking OTC Calcium plus D, but taking with Biktarvy    12/14/21: received COVID booster little more than 2 weeks ago.  Pain in right hand, neuralgia like, going to see Dr. Garcia. Also left shoulder pain, posteriorly for over a year or more. Does not remember an injury.   When he bends over, he feels winded. He was supposed to get CXR per PCP but has not had it. Discussed that this is mechanical in all likelihood.  He is compliant with his HIV medicine he is not compliant with CPAP.  He is getting little exercise, he is still drinking a moderate amount of alcohol.    6/23/22: discussed vitamins, COVID vaccine, monkeypox, shingles and vaccines  Spot on chest, seeing dermatologist. One occasionally itches.  It is the same as last roma   Left shoulder pain, seen by orthopedics. Has not had a bone density yet (last was 2018), given an order last visit. Had a fall, head trauma, may have injured nasal septum. Seen in ED and had CT of head and cervical spine 3/21, had to have stitches in lip. PFTs in march were normal([facility])    12/19/22; did receive flu vaccine. Had COVID 3 weeks ago, 11/29, seen at urgent care, given paxlovid rx but felt better so he did not fill. He still has chest congestion(did not call me). Taken mucinex D, 2-3 liquid  decongestants, cough suppressants. He coughs periodically, sputum is minimal, rare light yellow. No fever. No SOB except after a coughing jag. He hears himself wheeze occasionally. No pleurisy, no hemoptysis. Wearing CPAP again but dislikes it greatly. Remeron no longer helping him sleep. I discontinued his xanax because of continued moderate-heavy alcohol use and sub buspar.   Reviewed lab, PSA was a little higher, 4.41. seen by urology in sept after 4.13 PSA. Nocturia x 2-3.     6/19/23: interim reviewed. He was recently started on gabapentin 100 mg at bedtime for restless leg. Wearing CPAP again after a 3 month lag. Does not have anxiety control with the buspar. He requests BZD for 3-4 days per month. He has too many projects, personal and at work. He gets no exercise. He has cut down on alcohol by about 50% . He does feel better, but hasn't noted an improvement in sleep quality. His pulmonary doctor showed him the the benefit of his CPAP on his printout.   Left leg/hip outside ?greater trochanter and behind left knee, ?sciatica      Current Outpatient Medications:     xulagfbip-vvwnnept-udxwcvp ala (BIKTARVY) -25 mg (25 kg or greater), Take 1 tablet by mouth once daily., Disp: 90 tablet, Rfl: 1    busPIRone (BUSPAR) 5 MG Tab, Take 1 tablet (5 mg total) by mouth 2 (two) times daily as needed (anxiety)., Disp: 60 tablet, Rfl: 5    gabapentin (NEURONTIN) 100 MG capsule, Take 100 mg by mouth 3 (three) times daily., Disp: , Rfl:     mirtazapine (REMERON SOL-TAB) 30 MG disintegrating tablet, Take 15 mg by mouth nightly. , Disp: , Rfl:     pantoprazole (PROTONIX) 40 MG tablet, Take 1 tablet (40 mg total) by mouth once daily., Disp: 90 tablet, Rfl: 3    pravastatin (PRAVACHOL) 40 MG tablet, Take 1 tablet (40 mg total) by mouth once daily., Disp: 30 tablet, Rfl: 6    risedronate (ACTONEL) 35 MG tablet, Take one tablet weekly on an empty stomach, with a large glass of water, and do not recline for 30 minutes., Disp:  12 tablet, Rfl: 3    calcium-vitamin D3 (OS-JOSÉ MIGUEL 500 + D3) 500 mg-5 mcg (200 unit) per tablet, Take 1 tablet by mouth 2 (two) times daily with meals., Disp: , Rfl:     ergocalciferol (ERGOCALCIFEROL) 50,000 unit Cap, ergocalciferol (vitamin D2) 50,000 unit capsule, Disp: , Rfl:     LORazepam (ATIVAN) 0.5 MG tablet, Take 1 tablet (0.5 mg total) by mouth every 12 (twelve) hours as needed for Anxiety., Disp: 30 tablet, Rfl: 1    tadalafiL (CIALIS) 10 MG tablet, Take 1 tablet (10 mg total) by mouth as needed for Erectile Dysfunction. (Patient not taking: Reported on 6/19/2023), Disp: 20 tablet, Rfl: 1  Review of patient's allergies indicates:   Allergen Reactions    Sulfa (sulfonamide antibiotics) Rash     Past Medical History:   Diagnosis Date    Allergic sinusitis     Chronic    Snell's esophagus     on EGD    Colon polyps     Detached vitreous humor, bilateral     GERD (gastroesophageal reflux disease)     HH    Giardia 2014    HIV infection 11/1992    H/o HPV and KS (prior meds:indiavir, com/nfv, then atripla 06/2013    Hyperlipidemia     Mild pulmonary hypertension 03/2010    Found on Echo    Neurofibromatosis     Olecranon bursitis, left elbow 10/2015    CINDY (obstructive sleep apnea)     Not using CPAP    Restless leg syndrome     Rib fractures 2020    Squamous cell carcinoma 07/2019    forehead    Abdul-Leeroy syndrome     cause unclear    Tinnitus     Vitamin D deficiency         Past Surgical History:   Procedure Laterality Date    Adenomatous Polyp  04/2007    ([facility])    COLONOSCOPY  03/2004    FOOT NEUROMA SURGERY      PROSTATE BIOPSY      Negative    Repair of Deviated Septum/Uvuloplasty  02/2011    [facility]    TONSILLECTOMY      UVULECTOMY  2011         , colonoscopy with adenomatous polyp    Social History     Socioeconomic History    Marital status: Single   Tobacco Use    Smoking status: Never    Smokeless tobacco: Never   Substance and Sexual Activity    Alcohol use: Yes     Comment: 3-5 times a  week    Drug use: No     Family History   Problem Relation Age of Onset    Hypertension Mother     Hypertension Father     Neurofibromatosis Sister         Younger sister, different sister with glioblastoma    Diabetes Paternal Grandmother     Prostate cancer Paternal Uncle     Multiple family members with neurofibromatosis    Travel History:   Vaccine History: Yearly flu vaccine, Menactra 2011, typhoid 2011, Pneumovax 2007,2018 , Prevnar 2014, tdap 2016, hepatitis A 1 into 2005, Zostavax 2014, hepatitis B series nonresponder, repeated 2016 nonresponder, shingrix #1 and 2 2018, MMR 11/2019, COVID x 2 4/2021, #3.   Advanced Directive:   Safer Sex: Abstinent  Bone Density: Osteopenia 2011, 2015, 12/2018  IMPRESSION:   The T-score of the L1-L4 vertebral bodies is -1.0. The T-score of the left  femoral neck is calculated to be   -1.4.   The T-score of the right femoral neck is calculated to be -1.0. The patient is still considered osteopenic according to World Health Organization guidelines and insufficiency fracture risk remains moderate.  Colonoscopy: 2004, 2007, 2015, 2021(Dr. Zuleta)      :Review of Systems    Constitutional: No fever, chills, sweats, fatigue, weakness, weight loss.  very.minimal exercise. Joined health club over 6 months ago, goes infrequently. Never feels rested. Works way too hard    Eyes: No change in vision, loss of vision, diplopia, photophobia. UTD eye exam    ENT: No sinus drainage, sore throat, mouth pain, or lesions. UTD dental exam. Mild hearing loss.wears hearing aids       Cardiovascular: No chest pain, occasional  KEENAN when he moves something heavy, no palpitations  And has still been having occasional LE edema.  No orthopnea. Normal EF 12/2019.  Seen by Dr. Mcdonald the cardiologist as well.      Respiratory: No shortness of breath,   see HPI Sees Dr. Mcdonald the pulmonologist as well.    Gastrointestinal: No abdominal pain, nausea, vomiting,  constipation, had a colonoscopy per   [place] fall of 2021.   No blood in stool, and occasional loose stools. He comlaints of abdominal bloating, no matter what he eats.     Genitourinary: No dysuria, hematuria, incontinence, frequency, flank pain,   1-2 nocturia. He has ED, taking tadalafil which is not effective. PSA is staying in mid 4's and is followed by Dr. Helm. He had a nodule on MRI 9/2022, not biopsied    Musculoskeletal: see HPI regarding left  hip. Denies injury    Integumentary:    Has seen derm and does so fairly regularly and something left face was removed which was malignant. There is a rough spot left temple    Vascular: no Claudication or major varicosities but he does have to wear support socks.  .     Neurological: No unusual headaches, neuropathy, or falls. . He saw his neurologist in early 2023 and he has slow progress of neurofibromatosis and had last MRI was 2019 ?  Psychiatric:  Taking buspar ,see HPI,  decreased beers per week,  . Previously and again Discussed non pharmacologic ways to manage anxiety,s tress like counseling  , meditation, yoga, a vacation!  Restless leg syndrome    Endocrine: NO diabetes, Thyroid normal     Lymphatic: No lymphadenopathy, blood loss, anemia, or malignancy    Objective:      Blood pressure 120/68, pulse 71, weight 87.5 kg (192 lb 12.8 oz), SpO2 95 %. Body mass index is 30.2 kg/m².  Physical Exam      General: Alert and attentive, cooperative and in no distress. Moderate lipoatrophy of face and limbs, increased girth    Eyes: Pupils equal, round, reactive to light, anicteric, EOMI  .    Neck: Supple, non-tender, no thyromegaly or masses. No JVD    ENT: EAC patent, TM normal, nares patent, no oral lesions, teeth in good condition, no thrush. Hearing aids .  Always has erythematous posterior pharynx.     Cardiovascular: Regular rate and rhythm, no murmurs, rubs, or gallop    Respiratory: Lungs clear without wheezes, no rales, rub or rhonci.   Gastrointestinal: Active bowel sounds, soft, no mass  or organomegaly, no distention.protuberant    Genitourinary: No  flank tenderness    Vascular: mild LE edema,no  phlebitis, pulses normal. Warm and well perfused, no calf or popliteal tenderness, no cords.    Musculoskeletal: Ambulates without difficulty, no acute arthritis, synovitis or myositis. Normal muscle bulk and strength. Some tenderness in paraspinous muscles superior to SI joint. Negative SLR, no pain with hip rotation    Integumentary: Skin without rashes . He has seborrheic keratoses on his upper chest.  Does have an increasing number of neurofibromas.      AnusRectum: last prostate check per primary, rectal per colonoscopy    Neurological: Normal LOC, cranial nerves, speech, reflexes, normal gait. .    Psychiatric: Normal mood, speech, demeanor    Lymphatic: No cervical, supraclavicular, but he has right axillary LN enlargement.         Wound:     HIV Table:   HLA-B 5701     TOXOIgG  neg   RPR 11/2020 non reactive   HEP A IgG  vaccinated 2005, 2012   HBsAg 8/14/2015 neg, vacc 1980's, 2015 x3   HBsAb 6/19/17 neg, nonresponder after 2 series    HBcAb 8/18/2015 negative   HBeAb     HBeAg     HCVAb 12/2018 negative   HBV DNA     HCV RNA     Vitamin D       Chlamydia / GC 4/3/2015 negative   TB Gold  8/2021      negative  PSA   8/2021  3.7      Results for ALVARADO CORDOVA (MRN [id number]) as of 6/18/2020 22:02   Ref. Range 11/8/2019 07:12   Rubeola IgG Latest Units: AU/mL <25.00 (L)     Recent Diagnostics: reviewed  12/31/19 TTE   1. Normal left ventricular systolic function.   2. Mildly elevated systolic pulmonary artery pressure.  37   3. Right ventricular systolic function appears normal.    12/31/19 ETT  1. Normal control electrocardiogram.  2. Treadmill stress ECG is negative for ischemia.  3. Functional capacity is good for age.    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  Dispense: 30 tablet; Refill: 1      Please ask Dr. Helm about a repeat MRI prostate    Eat more slowly, consider trying some gas-x, mylicon    Return in 5 months with lab ahead  Same medications    For your left lower back discomfort I would recommend seeing a physical therapist to help relieve the discomfort and strengthen the muscles to prevent recurrence or you could see your orthopedic    You should hear from US at [facility] [place] regarding scheduling of your procedure

## 2023-06-19 NOTE — PATIENT INSTRUCTIONS
Please ask Dr. Helm about a repeat MRI prostate    Eat more slowly, consider trying some gas-x, mylicon    Return in 5 months with lab ahead  Same medications    For your left lower back discomfort I would recommend seeing a physical therapist to help relieve the discomfort and strengthen the muscles to prevent recurrence or you could see your orthopedic    You should hear from US at [facility] regarding scheduling of your procedure

## 2023-07-17 DIAGNOSIS — E78.5 HYPERLIPIDEMIA, UNSPECIFIED HYPERLIPIDEMIA TYPE: Primary | ICD-10-CM

## 2023-07-17 RX ORDER — PRAVASTATIN SODIUM 40 MG/1
40 TABLET ORAL DAILY
Qty: 30 TABLET | Refills: 6 | Status: SHIPPED | OUTPATIENT
Start: 2023-07-17 | End: 2024-02-12

## 2023-07-17 RX ORDER — BUSPIRONE HYDROCHLORIDE 5 MG/1
5 TABLET ORAL 2 TIMES DAILY PRN
Qty: 60 TABLET | Refills: 5 | Status: SHIPPED | OUTPATIENT
Start: 2023-07-17 | End: 2023-12-14 | Stop reason: SDUPTHER

## 2023-11-02 ENCOUNTER — TELEPHONE (OUTPATIENT)
Dept: INFECTIOUS DISEASES | Facility: CLINIC | Age: 68
End: 2023-11-02

## 2023-11-02 DIAGNOSIS — B20 HIV INFECTION, UNSPECIFIED SYMPTOM STATUS: Primary | ICD-10-CM

## 2023-11-02 DIAGNOSIS — E78.5 HYPERLIPIDEMIA, UNSPECIFIED HYPERLIPIDEMIA TYPE: ICD-10-CM

## 2023-11-02 NOTE — TELEPHONE ENCOUNTER
Patient called  [phone]    He has an appointment with Dr Kimball on 11/20/23    He is requesting lab orders be sent to [facility]   ( Sheryl Carbone )     SAM [facility]  11/02/23

## 2023-11-03 NOTE — TELEPHONE ENCOUNTER
I faxed lab orders to [facility] and   Left a message for patient .  SAM [facility]  11/03/23

## 2023-11-14 LAB
ALBUMIN SERPL-MCNC: 3.9 G/DL (ref 3.6–5.1)
ALBUMIN/GLOB SERPL: 1.7 (CALC) (ref 1–2.5)
ALP SERPL-CCNC: 44 U/L (ref 35–144)
ALT SERPL-CCNC: 16 U/L (ref 9–46)
AST SERPL-CCNC: 16 U/L (ref 10–35)
BASOPHILS # BLD AUTO: 62 CELLS/UL (ref 0–200)
BASOPHILS NFR BLD AUTO: 0.8 %
BILIRUB SERPL-MCNC: 0.4 MG/DL (ref 0.2–1.2)
BUN SERPL-MCNC: 17 MG/DL (ref 7–25)
BUN/CREAT SERPL: ABNORMAL (CALC) (ref 6–22)
CALCIUM SERPL-MCNC: 9.4 MG/DL (ref 8.6–10.3)
CD3+CD4+ CELLS # BLD: 1131 CELLS/UL (ref 490–1740)
CD3+CD4+ CELLS NFR BLD: 35 % (ref 30–61)
CD3+CD4+ CELLS/CD3+CD8+ CLL BLD: 0.71 % (ref 0.86–5)
CD3+CD8+ CELLS # BLD: 1589 CELLS/UL (ref 180–1170)
CD3+CD8+ CELLS NFR BLD: 48 % (ref 12–42)
CHLORIDE SERPL-SCNC: 107 MMOL/L (ref 98–110)
CHOLEST SERPL-MCNC: 157 MG/DL
CHOLEST/HDLC SERPL: 3.8 (CALC)
CO2 SERPL-SCNC: 24 MMOL/L (ref 20–32)
CREAT SERPL-MCNC: 0.93 MG/DL (ref 0.7–1.28)
EGFR: 88 ML/MIN/1.73M2
EOSINOPHIL # BLD AUTO: 179 CELLS/UL (ref 15–500)
EOSINOPHIL NFR BLD AUTO: 2.3 %
ERYTHROCYTE [DISTWIDTH] IN BLOOD BY AUTOMATED COUNT: 13 % (ref 11–15)
GLOBULIN SER CALC-MCNC: 2.3 G/DL (CALC) (ref 1.9–3.7)
GLUCOSE SERPL-MCNC: 107 MG/DL (ref 65–99)
HCT VFR BLD AUTO: 46.1 % (ref 38.5–50)
HDLC SERPL-MCNC: 41 MG/DL
HGB BLD-MCNC: 15 G/DL (ref 13.2–17.1)
HIV1 RNA # SERPL NAA+PROBE: NOT DETECTED COPIES/ML
HIV1 RNA SERPL NAA+PROBE-LOG#: NOT DETECTED LOG COPIES/ML
LDLC SERPL CALC-MCNC: 86 MG/DL (CALC)
LYMPHOCYTES # BLD AUTO: 3034 CELLS/UL (ref 850–3900)
LYMPHOCYTES # BLD AUTO: 3277 CELLS/UL (ref 850–3900)
LYMPHOCYTES NFR BLD AUTO: 38.9 %
MCH RBC QN AUTO: 28.8 PG (ref 27–33)
MCHC RBC AUTO-ENTMCNC: 32.5 G/DL (ref 32–36)
MCV RBC AUTO: 88.5 FL (ref 80–100)
MONOCYTES # BLD AUTO: 928 CELLS/UL (ref 200–950)
MONOCYTES NFR BLD AUTO: 11.9 %
NEUTROPHILS # BLD AUTO: 3596 CELLS/UL (ref 1500–7800)
NEUTROPHILS NFR BLD AUTO: 46.1 %
NONHDLC SERPL-MCNC: 116 MG/DL (CALC)
PLATELET # BLD AUTO: 238 THOUSAND/UL (ref 140–400)
PMV BLD REES-ECKER: 9.6 FL (ref 7.5–12.5)
POTASSIUM SERPL-SCNC: 4.2 MMOL/L (ref 3.5–5.3)
PROT SERPL-MCNC: 6.2 G/DL (ref 6.1–8.1)
RBC # BLD AUTO: 5.21 MILLION/UL (ref 4.2–5.8)
SODIUM SERPL-SCNC: 140 MMOL/L (ref 135–146)
TRIGL SERPL-MCNC: 205 MG/DL
WBC # BLD AUTO: 7.8 THOUSAND/UL (ref 3.8–10.8)

## 2023-11-20 ENCOUNTER — OFFICE VISIT (OUTPATIENT)
Dept: INFECTIOUS DISEASES | Facility: CLINIC | Age: 68
End: 2023-11-20
Payer: MEDICARE

## 2023-11-20 VITALS
TEMPERATURE: 98 F | HEIGHT: 67 IN | DIASTOLIC BLOOD PRESSURE: 70 MMHG | HEART RATE: 71 BPM | WEIGHT: 191.63 LBS | BODY MASS INDEX: 30.08 KG/M2 | SYSTOLIC BLOOD PRESSURE: 118 MMHG

## 2023-11-20 DIAGNOSIS — Q85.00 NEUROFIBROMATOSIS: ICD-10-CM

## 2023-11-20 DIAGNOSIS — B20 HIV INFECTION, UNSPECIFIED SYMPTOM STATUS: Primary | ICD-10-CM

## 2023-11-20 DIAGNOSIS — Z79.899 ENCOUNTER FOR LONG-TERM (CURRENT) USE OF MEDICATIONS: ICD-10-CM

## 2023-11-20 DIAGNOSIS — R14.0 ABDOMINAL BLOATING: ICD-10-CM

## 2023-11-20 DIAGNOSIS — E78.5 HYPERLIPIDEMIA, UNSPECIFIED HYPERLIPIDEMIA TYPE: ICD-10-CM

## 2023-11-20 DIAGNOSIS — G47.33 OSA (OBSTRUCTIVE SLEEP APNEA): ICD-10-CM

## 2023-11-20 DIAGNOSIS — B20 LIPODYSTROPHY ASSOCIATED WITH HUMAN IMMUNODEFICIENCY VIRUS INFECTION: ICD-10-CM

## 2023-11-20 DIAGNOSIS — E88.1 LIPODYSTROPHY ASSOCIATED WITH HUMAN IMMUNODEFICIENCY VIRUS INFECTION: ICD-10-CM

## 2023-11-20 PROCEDURE — 99215 OFFICE O/P EST HI 40 MIN: CPT | Mod: S$GLB,,, | Performed by: INTERNAL MEDICINE

## 2023-11-20 PROCEDURE — 99215 PR OFFICE/OUTPT VISIT, EST, LEVL V, 40-54 MIN: ICD-10-PCS | Mod: S$GLB,,, | Performed by: INTERNAL MEDICINE

## 2023-11-20 RX ORDER — BICTEGRAVIR SODIUM, EMTRICITABINE, AND TENOFOVIR ALAFENAMIDE FUMARATE 50; 200; 25 MG/1; MG/1; MG/1
1 TABLET ORAL DAILY
Qty: 90 TABLET | Refills: 1 | Status: SHIPPED | OUTPATIENT
Start: 2023-11-20 | End: 2023-12-26 | Stop reason: SDUPTHER

## 2023-11-20 RX ORDER — RISEDRONATE SODIUM 35 MG/1
TABLET, FILM COATED ORAL
Qty: 12 TABLET | Refills: 3 | Status: SHIPPED | OUTPATIENT
Start: 2023-11-20 | End: 2023-11-20

## 2023-11-20 RX ORDER — PANTOPRAZOLE SODIUM 40 MG/1
40 TABLET, DELAYED RELEASE ORAL DAILY
Qty: 90 TABLET | Refills: 3 | Status: SHIPPED | OUTPATIENT
Start: 2023-11-20 | End: 2024-11-19

## 2023-11-20 NOTE — PATIENT INSTRUCTIONS
Continue biktarvy    Stop risedronate/Actonel. Repeat bone density in 2 years    Prevnar 20 vaccine was intended but was inadvertently omitted, will send as RX    Saw palmetto is a supplement that can help shrink the prostate    Referral to [facility] infectious diseases

## 2023-11-20 NOTE — LETTER
Verona Kimball MD  Infectious Diseases  [address], [address]  [place] [address]  [phone]        Patient Name: Dmitry Epps  YOB: 1955/23    Dear Dr. Hernandez and Ms. Ramirez:    Dear Ms. Ramirez:    Please accept this letter as referral of my established patient, Dmitry Epps, to the [facility] ID attending clinic for future HIV care. It has been my pleasure and my honor to be his physician since 2004. We are faxing demographics and mailing   most recent notes, labs and diagnostics. Please contact me and our office for any additional information needed and please contact Mr. Epps on his cell at [phone] to schedule the appointment.  Much of his records are visible on Epic Care Everywhere.       Sincerely,           Verona Kimball MD

## 2023-11-20 NOTE — PROGRESS NOTES
Subjective:       Patient ID: Dmitry Epps is a 68 y.o. male.    Chief Complaint:: HIV Positive     Since last visit. He did not see Dr. Trevino nor have his MRI , missed his appointment  Did not have lab as requested  Colonoscopy isn't until 2020  Has not reduced alcohol yet  Had yearly physical with Dr. De Paz in September and did not have his lab orders either. Prostate LOU was normal. Had a stomach bug that day.     7/12/19: just had a Squamous cell cancer removed from his forehead.   He saw Dr. Trevino and no additional MRI was needed, next visit in 1/2020, the previous MRI was stable.   Bone density still has osteopenia, and he is compliant with vitamin D and calcium  He has cut back on beer a little , but states he needs to cut back more. He is not exercising though his job is an active one.  He does not feel rested, but hasn't worn CPAP in years. He is open toseeing the sleep doctor again and trying a new device, etc.   He still feels bloated when eating, and acknowledges that he eats quickly.     1/17/20: cutting back on beer even more. He did join an exercise club that started last night, with a goal of losing 40-45#, BMI 29. Did not make an appointment with sleep doctor, but he plans to. He     Had a syncopal episode about 3 months ago. His prior cardiologist retired and has seen Dr. Turner. He cannot tell me that he told the cardiologist and his notes do not reflect this but subsequently remembers telling the cardiologist that he had lightheadedness.. Had a fracture of the left wrist and had bumps on his head. He went to an urgent care.  He was referred to orthopedist, Dr. Montanez.  He had difficulty getting adequate pain relief.  The cardiologist did an echo and a stress test which were ok, at [facility]. He has episodes of lightheadedness and pre-syncope and mostly when he is getting up from bed in the morning. There are times when he describes vertigo too with associated changes in position or changes of  his head or movements of his eye. Changing from atripla to biktarvy has not been associated with a change (might have expected this on atripla but not Biktarvy). He has not noted a pattern, ie after an evening of beer and not drinking enough fluids but he may keep a diary of this.    Reviewed 11/2019 lab and vitamin D low, viral load undetectable. Lipids were better, PSA was fine. Switched to biktarvy in September. Did receive a flu vaccine and a MMR booster.     6/18/20: he has continued to work through the COVID pandemic, but he is protecting himself in public places, except neighborhood bar. His gym closed and he gained back the weight that he had lost. During the work outs he fractured 7-9 ribs (no fall)his BMD in 12/2018 was roughly the same as the test in 2015 12/17/20: since last visit he had COVID on 11/9/20. He felt poorly for 3 days before then and for 2 weeks after. He has had negative COVID 12/7, and IgG positive 12/9.   He had a virtual visit with his cardiologist. STress test was ok and echo showed mild pulmonary hypertension, 36 mmHg. He did wear CPAP while he had COVID, but now the mask is very irritating. He can wear it sometimes up to 6 hours. He did have flu vax.  He has been taking the Remeron at night and alprazolam 0.5 in the morning, almost every morning that he goes to work because of the tremendous anxiety and stress that he faces when he goes to work.  We discussed in detail that this is not a fruitful strategy for handling daily stress as alprazolam is habit forming.  I was under the impression it was for bedtime.  We discussed at length:  His discussing with his superior ways to reduce his stress.  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  We discussed counseling to address coping mechanism and alternative medications such as an SSRI or buspirone which are not habit forming.  He is willing.  He has decreased the amount of beer that  he drinks at night and the frequency with which he visits the local bar.    6/17/21: cutting back on remeron at bedtime to reduce daytime fatigue. He has untreated sleep apnea since he stopped using it after COVID. He spoke to his respiratory provider. He went to his father's home in [place] for 2 weeks to help him, which was exhausting. He is 88 years old. He has not yet resumed the CPAP but he may need new equipment or a repeat sleep test.  He states he will work on that this week  He is having achilles pain, working on this with podiatry, Josef Newtno DPM, and Dr. Jones  He made an appt with Dr. Ho for colonoscopy 6/22. Sees Dr. Burris 8/11/21 in place of Dr. brewster'  Taking OTC Calcium plus D, but taking with Biktarvy    12/14/21: received COVID booster little more than 2 weeks ago.  Pain in right hand, neuralgia like, going to see Dr. Garcia. Also left shoulder pain, posteriorly for over a year or more. Does not remember an injury.   When he bends over, he feels winded. He was supposed to get CXR per PCP but has not had it. Discussed that this is mechanical in all likelihood.  He is compliant with his HIV medicine he is not compliant with CPAP.  He is getting little exercise, he is still drinking a moderate amount of alcohol.    6/23/22: discussed vitamins, COVID vaccine, monkeypox, shingles and vaccines  Spot on chest, seeing dermatologist. One occasionally itches.  It is the same as last roma   Left shoulder pain, seen by orthopedics. Has not had a bone density yet (last was 2018), given an order last visit. Had a fall, head trauma, may have injured nasal septum. Seen in ED and had CT of head and cervical spine 3/21, had to have stitches in lip. PFTs in march were normal([facility])    12/19/22; did receive flu vaccine. Had COVID 3 weeks ago, 11/29, seen at urgent care, given paxlovid rx but felt better so he did not fill. He still has chest congestion(did not call me). Taken mucinex D, 2-3 liquid  decongestants, cough suppressants. He coughs periodically, sputum is minimal, rare light yellow. No fever. No SOB except after a coughing jag. He hears himself wheeze occasionally. No pleurisy, no hemoptysis. Wearing CPAP again but dislikes it greatly. Remeron no longer helping him sleep. I discontinued his xanax because of continued moderate-heavy alcohol use and sub buspar.   Reviewed lab, PSA was a little higher, 4.41. seen by urology in sept after 4.13 PSA. Nocturia x 2-3.     6/19/23: interim reviewed. He was recently started on gabapentin 100 mg at bedtime for restless leg. Wearing CPAP again after a 3 month lag. Does not have anxiety control with the buspar. He requests BZD for 3-4 days per month. He has too many projects, personal and at work. He gets no exercise. He has cut down on alcohol by about 50% . He does feel better, but hasn't noted an improvement in sleep quality. His pulmonary doctor showed him the the benefit of his CPAP on his printout.   Left leg/hip outside ?greater trochanter and behind left knee, ?sciatica    11/20/23: last visit after many years of care. (2/2004) . Did receive his flu vaccine. Went over numerous questions, reviewed prostate work up , urology notes, who felt that his BPH 100g prostate was why his PSA was elevated. The value was actually a little lower. [place] [facility] [place]       Current Outpatient Medications:     busPIRone (BUSPAR) 5 MG Tab, Take 1 tablet (5 mg total) by mouth 2 (two) times daily as needed (anxiety)., Disp: 60 tablet, Rfl: 5    calcium-vitamin D3 (OS-JOSÉ MIGUEL 500 + D3) 500 mg-5 mcg (200 unit) per tablet, Take 1 tablet by mouth 2 (two) times daily with meals., Disp: , Rfl:     gabapentin (NEURONTIN) 100 MG capsule, Take 100 mg by mouth 3 (three) times daily., Disp: , Rfl:     mirtazapine (REMERON SOL-TAB) 30 MG disintegrating tablet, Take 15 mg by mouth nightly. , Disp: , Rfl:     pravastatin (PRAVACHOL) 40 MG tablet, Take 1 tablet (40 mg  total) by mouth once daily., Disp: 30 tablet, Rfl: 6    kdtxqerkh-dtnfvzcp-oevjsrh ala (BIKTARVY) -25 mg (25 kg or greater), Take 1 tablet by mouth once daily., Disp: 90 tablet, Rfl: 1    LORazepam (ATIVAN) 0.5 MG tablet, Take 1 tablet (0.5 mg total) by mouth every 12 (twelve) hours as needed for Anxiety., Disp: 30 tablet, Rfl: 1    pantoprazole (PROTONIX) 40 MG tablet, Take 1 tablet (40 mg total) by mouth once daily., Disp: 90 tablet, Rfl: 3    pneumoc 20-eleno conj-dip cr,PF, (PREVNAR-20, PF,) 0.5 mL Syrg injection, Inject 0.5 mLs into the muscle once. for 1 dose, Disp: 0.5 mL, Rfl: 0    tadalafiL (CIALIS) 10 MG tablet, Take 1 tablet (10 mg total) by mouth as needed for Erectile Dysfunction. (Patient not taking: Reported on 6/19/2023), Disp: 20 tablet, Rfl: 1  Review of patient's allergies indicates:   Allergen Reactions    Sulfa (sulfonamide antibiotics) Rash     Past Medical History:   Diagnosis Date    Allergic sinusitis     Chronic    Anal wart 1985    Snell's esophagus     on EGD    Colon polyps     Detached vitreous humor, bilateral     GERD (gastroesophageal reflux disease)     HH    Giardia 2014    HIV infection 11/1992    H/o HPV and KS (prior meds:indiavir, com/nfv, then atripla 06/2013    Hyperlipidemia     Mild pulmonary hypertension 03/2010    Found on Echo    Neurofibromatosis     Olecranon bursitis, left elbow 10/2015    CINDY (obstructive sleep apnea)     Not using CPAP    Restless leg syndrome     Rib fractures 2020    Squamous cell carcinoma 07/2019    forehead    Abdul-Leeroy syndrome     cause unclear    Tinnitus     Vitamin D deficiency         Past Surgical History:   Procedure Laterality Date    Adenomatous Polyp  04/2007    ([facility])    COLONOSCOPY  03/2004    FOOT NEUROMA SURGERY      PROSTATE BIOPSY      Negative    Repair of Deviated Septum/Uvuloplasty  02/2011    [place]    TONSILLECTOMY      UVULECTOMY  2011         , colonoscopy with adenomatous polyp    Social History      Socioeconomic History    Marital status: Single   Tobacco Use    Smoking status: Never    Smokeless tobacco: Never   Substance and Sexual Activity    Alcohol use: Yes     Comment: 3-5 times a week    Drug use: No     Family History   Problem Relation Age of Onset    Hypertension Mother     Hypertension Father     Neurofibromatosis Sister         Younger sister, different sister with glioblastoma    Diabetes Paternal Grandmother     Prostate cancer Paternal Uncle     Multiple family members with neurofibromatosis    Travel History:   Vaccine History: Yearly flu vaccine, Menactra 2011, typhoid 2011, Pneumovax 2007,2018 , Prevnar 2014, tdap 2016, hepatitis A 1 into 2005, Zostavax 2014, hepatitis B series nonresponder, repeated 2016 nonresponder, shingrix #1 and 2 2018, MMR 11/2019, COVID x 2 4/2021, #3.   Advanced Directive:   Safer Sex: Abstinent  Bone Density: Osteopenia 2011, 2015, 12/2018  IMPRESSION:   The T-score of the L1-L4 vertebral bodies is -1.0. The T-score of the left  femoral neck is calculated to be   -1.4.   The T-score of the right femoral neck is calculated to be -1.0. The patient is still considered osteopenic according to World Health Organization guidelines and insufficiency fracture risk remains moderate.  2022 bone density: normal    Colonoscopy: 2004, 2007, 2015, 2021(Dr. Neela Ho)      :Review of Systems   All other systems reviewed and are negative.      Constitutional: No fever, chills, sweats, fatigue, weakness, weight loss.  very.minimal exercise. Joined health club over 12 months ago, plans to start going after new year. Works   too hard. Leaves partnership due to age 68, will work a little less.    Eyes: No change in vision, loss of vision, diplopia, photophobia. UTD eye exam    ENT: No sinus drainage, sore throat, mouth pain, or lesions. UTD dental exam. Mild hearing loss.wears hearing aids       Cardiovascular: No chest pain, occasional  KEENAN when he moves something heavy,  no palpitations  And has still been having occasional LE edema and wears compression stockings.  No orthopnea. Normal EF 12/2019.  Seen by Dr. Mcdonald the cardiologist as well.      Respiratory: No shortness of breath,   see HPI Sees Dr. Mcdonald the pulmonologist .Still does not like wearing the CPAP. Has measurements of his oxygen from sleep that show that he desats into upper 80's. Discussed this is because he has CINDY and is not wearing his CPAP.     Gastrointestinal: No abdominal pain, nausea, vomiting,  constipation, had a colonoscopy per Dr. Zuleta fall of 2021.   No blood in stool, and occasional loose stools. He comlaints of abdominal bloating, and wonders if he is gluten sensitive. He has not yet tried gluten free diet. We reviewed testing for gluten sensitivity and celiac disease(doubt the latter)    Genitourinary: No dysuria, hematuria, incontinence, frequency, flank pain,   1-2 nocturia. He has ED, taking tadalafil which is not effective. PSA is staying in mid 4's and is followed by Dr. Helm. He had a nodule on MRI 9/2022, not biopsied    Musculoskeletal: no new aches, pains or injuries    Integumentary:    Has seen derm and does so fairly regularly and had something left face early in the year was removed which was malignant. There is a rough spot left temple, but does not recall attention to that.     Vascular: no Claudication or major varicosities but he does have to wear support socks.  .     Neurological: No unusual headaches, neuropathy, or falls. . He saw his neurologist in early 2023 and he feels that he has gradually enlarging  neurofibromatosis lesions on arms, and had last MRI was 2019 ?    Psychiatric:  Taking buspar ,see HPI, loves beers with his buddies,  . Previously and again Discussed non pharmacologic ways to manage anxiety,s tress like counseling  , meditation, yoga, a vacation!  Restless leg syndrome for which he was given 100 mg of gabapentin to take at bedtime, which he only takes  "periodically, as it gives him morning grogginess.    Endocrine: NO diabetes, Thyroid normal     Lymphatic: No lymphadenopathy, blood loss, anemia, or malignancy    Objective:      Blood pressure 118/70, pulse 71, temperature 97.9 °F (36.6 °C), height 5' 7" (1.702 m), weight 86.9 kg (191 lb 9.6 oz), SpO2 (P) 95 %. Body mass index is 30.01 kg/m².  Physical Exam      General: Alert and attentive, cooperative and in no distress. Moderate lipoatrophy of face and limbs, increased girth    Eyes: Pupils equal, round, reactive to light, anicteric, EOMI  .    Neck: Supple, non-tender, no thyromegaly or masses. No JVD    ENT: EAC patent, TM normal, nares patent, no oral lesions, teeth in good condition, no thrush. Hearing aids .  Always has erythematous posterior pharynx.     Cardiovascular: Regular rate and rhythm, no murmurs, rubs, or gallop    Respiratory: Lungs clear without wheezes, no rales, rub or rhonci.   Gastrointestinal: Active bowel sounds, soft, no mass or organomegaly, no distention.protuberant    Genitourinary: No  flank tenderness    Vascular: mild LE edema,no  phlebitis, pulses normal. Warm and well perfused, no calf or popliteal tenderness, no cords.    Musculoskeletal: Ambulates without difficulty, no acute arthritis, synovitis or myositis. Normal muscle bulk and strength.      Integumentary: Skin without rashes . He has seborrheic keratoses on his upper chest.  Does have an increasing number of neurofibromas.      AnusRectum: last prostate check per primary/urology,      Neurological: Normal LOC, cranial nerves, speech, reflexes, normal gait. .    Psychiatric: Normal mood, speech, demeanor    Lymphatic: No cervical, supraclavicular, but he has mild  right anterior axillary LN enlargement, no change from last exam and may be lateral side of pectoral muscle, minimal asymmetry.         Wound:     HIV Table:   HLA-B 5701     TOXOIgG  neg   RPR 6/2023 non reactive   HEP A IgG  vaccinated 2005, 2012   HBsAg " 8/14/2015 neg, vacc 1980's, 2015 x3   HBsAb 6/19/17 neg, nonresponder after 2 series    HBcAb 8/18/2015 negative   HBeAb     HBeAg     HCVAb 12/2018 negative   HBV DNA     HCV RNA     Vitamin D       Chlamydia / GC 4/3/2015 negative   TB Gold  8/2021      negative  PSA   54660  4.7, lower from 4.9    CD4   11.2023 >1100    Results for ALVARADO CORDOVA (MRN [id number]) as of 6/18/2020 22:02   Ref. Range 11/8/2019 07:12   Rubeola IgG Latest Units: AU/mL <25.00 (L)     Recent Diagnostics: reviewed  12/31/19 TTE   1. Normal left ventricular systolic function.   2. Mildly elevated systolic pulmonary artery pressure.  37   3. Right ventricular systolic function appears normal.    12/31/19 ETT  1. Normal control electrocardiogram.  2. Treadmill stress ECG is negative for ischemia.  3. Functional capacity is good for age.    Assessment and Plan:           HIV infection, unspecified symptom status  -     pfagvshhj-dtbaxuvo-fvijmfj ala (BIKTARVY) -25 mg (25 kg or greater); Take 1 tablet by mouth once daily.  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  Dispense: 12 tablet; Refill: 3  -     pantoprazole (PROTONIX) 40 MG tablet; Take 1 tablet (40 mg total) by mouth once daily.  Dispense: 90 tablet; Refill: 3  -     Cancel: (In Office Administered) Pneumococcal Conjugate Vaccine (20 Valent) (IM) (Preferred)  -     pneumoc 20-eleno conj-dip cr,PF,  (PREVNAR-20, PF,) 0.5 mL Syrg injection; Inject 0.5 mLs into the muscle once. for 1 dose  Dispense: 0.5 mL; Refill: 0       Continue biktarvy    Stop risedronate/Actonel. Repeat bone density in 2 years    Prevnar 20 vaccine was intended but was inadvertently omitted, will send as RX    Saw palmetto is a supplement that can help shrink the prostate    Referral to [facility] [place]

## 2023-12-14 RX ORDER — TADALAFIL 10 MG/1
10 TABLET ORAL
Qty: 20 TABLET | Refills: 1 | Status: CANCELLED | OUTPATIENT
Start: 2023-12-14 | End: 2024-12-13

## 2023-12-14 RX ORDER — TADALAFIL 10 MG/1
10 TABLET ORAL
Qty: 20 TABLET | Refills: 1 | Status: SHIPPED | OUTPATIENT
Start: 2023-12-14 | End: 2024-12-13

## 2023-12-14 RX ORDER — BUSPIRONE HYDROCHLORIDE 5 MG/1
5 TABLET ORAL 2 TIMES DAILY PRN
Qty: 60 TABLET | Refills: 5 | Status: CANCELLED | OUTPATIENT
Start: 2023-12-14 | End: 2024-12-13

## 2023-12-14 RX ORDER — BUSPIRONE HYDROCHLORIDE 5 MG/1
5 TABLET ORAL 2 TIMES DAILY PRN
Qty: 60 TABLET | Refills: 5 | Status: SHIPPED | OUTPATIENT
Start: 2023-12-14 | End: 2024-12-13

## 2023-12-14 RX ORDER — LORAZEPAM 0.5 MG/1
0.5 TABLET ORAL EVERY 12 HOURS PRN
Qty: 30 TABLET | Refills: 1 | Status: SHIPPED | OUTPATIENT
Start: 2023-12-14 | End: 2024-01-13

## 2023-12-26 DIAGNOSIS — B20 HIV INFECTION, UNSPECIFIED SYMPTOM STATUS: Primary | ICD-10-CM

## 2023-12-27 RX ORDER — BICTEGRAVIR SODIUM, EMTRICITABINE, AND TENOFOVIR ALAFENAMIDE FUMARATE 50; 200; 25 MG/1; MG/1; MG/1
1 TABLET ORAL DAILY
Qty: 90 TABLET | Refills: 1 | Status: SHIPPED | OUTPATIENT
Start: 2023-12-27

## 2023-12-28 ENCOUNTER — PATIENT MESSAGE (OUTPATIENT)
Dept: INFECTIOUS DISEASES | Facility: HOSPITAL | Age: 68
End: 2023-12-28

## 2023-12-28 LAB
HLA-B*57:01 QL: NEGATIVE
TTG IGA SER-ACNC: <1 U/ML
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
## (undated) DEVICE — SHOE CAST POST-OP MEN SBUNION

## (undated) DEVICE — NDL 18GA X1 1/2 REG BEVEL

## (undated) DEVICE — GAUZE SPONGE 4'X4 12 PLY

## (undated) DEVICE — SUT 5/0 18IN PLIABILIZED ET

## (undated) DEVICE — SUT 3/0 18IN COATED VICRYLP

## (undated) DEVICE — PAD PREP 50/CA

## (undated) DEVICE — SEE MEDLINE ITEM 152515

## (undated) DEVICE — SEE MEDLINE ITEM 152522

## (undated) DEVICE — BLADE SURG #15 CARBON STEEL

## (undated) DEVICE — COVER OVERHEAD SURG LT BLUE

## (undated) DEVICE — APPLICATOR CHLORAPREP ORN 26ML

## (undated) DEVICE — DRESSING N ADH OIL EMUL 3X3

## (undated) DEVICE — PADDING CAST 4IN SPECIALIST

## (undated) DEVICE — SHOE POST-OP VEL CLOS M/MD

## (undated) DEVICE — INSTRUMENT SUCTION FRAZIER 12F

## (undated) DEVICE — BANDAGE DERMACEA STRETCH 4X1IN

## (undated) DEVICE — GAUZE SPONGE 4X4 12PLY

## (undated) DEVICE — TOURNIQUET SB QC DP 18X4IN

## (undated) DEVICE — STOCKINET 4INX48

## (undated) DEVICE — SEE MEDLINE ITEM 146308

## (undated) DEVICE — SEE MEDLINE ITEM 156953

## (undated) DEVICE — MANIFOLD 4 PORT

## (undated) DEVICE — GLOVE SURG BIOGEL LATEX SZ 7.5

## (undated) DEVICE — SUT CTD VICRYL 0 UND BR

## (undated) DEVICE — NDL HYPO REG 25G X 1 1/2

## (undated) DEVICE — SYR 10CC LUER LOCK

## (undated) DEVICE — SEE L#120831